# Patient Record
Sex: MALE | Race: OTHER | HISPANIC OR LATINO | Employment: UNEMPLOYED | URBAN - METROPOLITAN AREA
[De-identification: names, ages, dates, MRNs, and addresses within clinical notes are randomized per-mention and may not be internally consistent; named-entity substitution may affect disease eponyms.]

---

## 2017-01-30 ENCOUNTER — HOSPITAL ENCOUNTER (EMERGENCY)
Facility: HOSPITAL | Age: 47
Discharge: HOME | End: 2017-01-30
Attending: EMERGENCY MEDICINE

## 2017-01-30 VITALS
BODY MASS INDEX: 24.25 KG/M2 | TEMPERATURE: 98 F | DIASTOLIC BLOOD PRESSURE: 79 MMHG | HEART RATE: 55 BPM | OXYGEN SATURATION: 100 % | WEIGHT: 160 LBS | RESPIRATION RATE: 28 BRPM | HEIGHT: 68 IN | SYSTOLIC BLOOD PRESSURE: 152 MMHG

## 2017-01-30 DIAGNOSIS — M25.511 SHOULDER PAIN, RIGHT: ICD-10-CM

## 2017-01-30 DIAGNOSIS — S43.004A SHOULDER DISLOCATION, RIGHT, INITIAL ENCOUNTER: Primary | ICD-10-CM

## 2017-01-30 PROCEDURE — 23650 CLTX SHO DSLC W/MNPJ WO ANES: CPT

## 2017-01-30 PROCEDURE — 99284 EMERGENCY DEPT VISIT MOD MDM: CPT | Mod: 25

## 2017-01-30 PROCEDURE — 96375 TX/PRO/DX INJ NEW DRUG ADDON: CPT

## 2017-01-30 PROCEDURE — 25000003 PHARM REV CODE 250: Performed by: EMERGENCY MEDICINE

## 2017-01-30 PROCEDURE — 63600175 PHARM REV CODE 636 W HCPCS: Performed by: EMERGENCY MEDICINE

## 2017-01-30 PROCEDURE — 96361 HYDRATE IV INFUSION ADD-ON: CPT

## 2017-01-30 PROCEDURE — 96374 THER/PROPH/DIAG INJ IV PUSH: CPT

## 2017-01-30 RX ORDER — MORPHINE SULFATE 2 MG/ML
6 INJECTION, SOLUTION INTRAMUSCULAR; INTRAVENOUS
Status: COMPLETED | OUTPATIENT
Start: 2017-01-30 | End: 2017-01-30

## 2017-01-30 RX ORDER — IBUPROFEN 400 MG/1
800 TABLET ORAL
Status: DISCONTINUED | OUTPATIENT
Start: 2017-01-30 | End: 2017-01-30

## 2017-01-30 RX ORDER — HYDROCODONE BITARTRATE AND ACETAMINOPHEN 5; 325 MG/1; MG/1
1 TABLET ORAL EVERY 6 HOURS PRN
Qty: 15 TABLET | Refills: 0 | Status: SHIPPED | OUTPATIENT
Start: 2017-01-30 | End: 2020-02-05 | Stop reason: CLARIF

## 2017-01-30 RX ORDER — DIAZEPAM 10 MG/2ML
5 INJECTION INTRAMUSCULAR
Status: COMPLETED | OUTPATIENT
Start: 2017-01-30 | End: 2017-01-30

## 2017-01-30 RX ORDER — DIAZEPAM 5 MG/1
5 TABLET ORAL EVERY 8 HOURS PRN
Qty: 15 TABLET | Refills: 0 | Status: SHIPPED | OUTPATIENT
Start: 2017-01-30 | End: 2017-03-01

## 2017-01-30 RX ORDER — PROPOFOL 10 MG/ML
100 VIAL (ML) INTRAVENOUS
Status: COMPLETED | OUTPATIENT
Start: 2017-01-30 | End: 2017-01-30

## 2017-01-30 RX ADMIN — SODIUM CHLORIDE 1000 ML: 0.9 INJECTION, SOLUTION INTRAVENOUS at 09:01

## 2017-01-30 RX ADMIN — MORPHINE SULFATE 6 MG: 2 INJECTION, SOLUTION INTRAMUSCULAR; INTRAVENOUS at 08:01

## 2017-01-30 RX ADMIN — PROPOFOL 100 MG: 10 INJECTION, EMULSION INTRAVENOUS at 09:01

## 2017-01-30 RX ADMIN — DIAZEPAM 5 MG: 5 INJECTION, SOLUTION INTRAMUSCULAR; INTRAVENOUS at 08:01

## 2017-01-30 NOTE — ED AVS SNAPSHOT
[facility]  [address]  [place] [address]               Wes shefali Batista   2017  8:22 PM   ED    Descripción:  Male : 1970   Departamento:  [facility]           Rivera Cuidado fue coordinado por:     Provider Role From To    Gwen Garcia MD Attending Provider 17 --      Razón de la cydney     Shoulder Injury           Diagnósticos de Esta Visita        Comentarios    Shoulder dislocation, right, initial encounter    -  Primario     Shoulder pain, right           ED Disposition     Ninguna           Lista de tareas           Información de seguimiento     Realice un seguimiento con:  [facility]    Cómo:  Eyad    Cuándo:  2017    Especialidades:  Neurosurgery, Plastic Surgery, Podiatry, Surgical Oncology, Allergy, Cardiothoracic Surgery, Otolaryngology, Gastroenterology, Breast Surgery, Oral Surgery, Oral and Maxillofacial Surgery, Cardiology, Bariatrics, Internal Medicine, Family Medicine, Colon and Rectal Surgery    Por qué:  Orthopedics    Información de contacto:    [address] [facility] [place] [address]  [phone]          Realice un seguimiento con:  Jhonatan Shaefr MD    Cómo:  Eyad    Cuándo:  2017    Especialidad:  Orthopedic Surgery    Información de contacto:    [address]  [address]  [place] [address]  [phone]        Recetas para recoger        Disp Refills Start End    diazePAM (VALIUM) 5 MG tablet 15 tablet 0 2017 3/1/2017    Take 1 tablet (5 mg total) by mouth every 8 (eight) hours as needed (right shoulder spasms). - Oral    hydrocodone-acetaminophen 5-325mg (NORCO) 5-325 mg per tablet 15 tablet 0 2017     Take 1 tablet by mouth every 6 (six) hours as needed for Pain. - Oral      [facility]     [facility] -  Assistance  Registered nurses in the [facility] provide clinical advisement, health education, appointment booking, and  other advisory services.  Call for this free service at [phone].             Medicamentos           Mensaje sobre Medicamentos     Verify the changes and/or additions to your medication regime listed below are the same as discussed with your clinician today.  If any of these changes or additions are incorrect, please notify your healthcare provider.        EMPEZAR a chauncey estos medicamentos NUEVOS        Refills    diazePAM (VALIUM) 5 MG tablet 0    Sig: Take 1 tablet (5 mg total) by mouth every 8 (eight) hours as needed (right shoulder spasms).    Categoría: Print    Vía: Oral    hydrocodone-acetaminophen 5-325mg (NORCO) 5-325 mg per tablet 0    Sig: Take 1 tablet by mouth every 6 (six) hours as needed for Pain.    Categoría: Print    Vía: Oral      These medications were administered today        Dose Freq    sodium chloride 0.9% bolus 1,000 mL 1,000 mL ED 1 Time    Sig: Inject 1,000 mLs into the vein ED 1 Time.    Categoría: Normal    Vía: Intravenous    morphine injection 6 mg 6 mg ED 1 Time    Sig: Inject 3 mLs (6 mg total) into the vein ED 1 Time.    Categoría: Normal    Vía: Intravenous    diazePAM injection 5 mg 5 mg ED 1 Time    Sig: Inject 1 mL (5 mg total) into the vein ED 1 Time.    Categoría: Normal    Vía: Intravenous    propofol (DIPRIVAN) 10 mg/mL  mg ED 1 Time    Sig: Inject 10 mLs (100 mg total) into the vein ED 1 Time.    Categoría: Normal    Vía: Intravenous           Verifique que la siguiente lista de medicamentos es celia representación exacta de los medicamentos que está tomando actualmente. Si no hay ningunos reportados, la lista puede estar en reina. Si no es correcta, por favor póngase en contacto con young proveedor de atención médica. Lleve esta lista con usted en alex de emergencia.           Medicamentos Actuales     diazePAM (VALIUM) 5 MG tablet Take 1 tablet (5 mg total) by mouth every 8 (eight) hours as needed (right shoulder spasms).    diazePAM injection 5 mg Inject 1 mL  "(5 mg total) into the vein ED 1 Time.    hydrocodone-acetaminophen 5-325mg (NORCO) 5-325 mg per tablet Take 1 tablet by mouth every 6 (six) hours as needed for Pain.    propofol (DIPRIVAN) 10 mg/mL IVP Inject 10 mLs (100 mg total) into the vein ED 1 Time.           Información de referencia clínica           Sharonda signos vitales teresa     PS Pulso Temperatura Resp [place] Peso    126/75 72 97.6 °F (36.4 °C) (Oral) 16 5' 8" (1.727 m) 72.6 kg (160 lb)    SpO2 BMI (IMC)                98% 24.33 kg/m2          Alergias     A partir del:  1/30/2017        No Known Allergies      Vacunas     Administradas en la fecha de la visita:  1/30/2017        None      ED Micro, Lab, POCT     None      ED Imaging Orders     Start Ordered       Status Ordering Provider    01/30/17 2130 01/30/17 2130  X-ray Shoulder 2 or More Views Right  1 time imaging      In process     01/30/17 1942 01/30/17 1942  X-Ray Shoulder Trauma Right  1 time imaging      Final result         Instrucciones a roberto de talya       Apply ice to areas of pain.  Take medications as directed.  Follow up with Orthopedics next week.  Wear sling until it is removed by the orthopedic doctor.              Referencias/Adjuntos de talya     DISLOCATION: SHOULDER (REDUCED) ([place])      Registrarse para [facility]     Activating your [facility] account is as easy as 1-2-3!     1) Visit [email or web address], select Sign Up Now, enter this activation code and your date of birth, then select Next.  [email or web address]  Expires: 3/16/2017  9:37 PM      2) Create a username and password to use when you visit [facility] in the future and select a security question in case you lose your password and select Next.    3) Enter your e-mail address and click Sign Up!    Additional Information  If you have questions, please e-mail [email or web address] or call [phone] to talk to our [facility] staff. Remember, [facility] is NOT to be used for urgent needs. For medical emergencies, dial 911.       "   Smoking Cessation     If you would like to quit smoking:   You may be eligible for free services if you are a [place] resident and started smoking cigarettes before 1988.  Call the Smoking Cessation Trust (SCT) toll free at (728) 605-5313 or (394) 552-5129.   Call -QUIT-NOW if you do not meet the above criteria.             [facility] complies with applicable Federal civil rights laws and does not discriminate on the basis of race, color, national origin, age, disability, or sex.        Language Assistance Services     ATTENTION: Language assistance services are available, free of charge. Please call [phone].      ATENCIÓN: Si habla español, tiene a young disposición servicios gratuitos de asistencia lingüística. Llame al [phone].     CHÚ Ý: N?u b?n nói Ti?ng Vi?t, có các d?ch v? h? tr? ngôn ng? mi?n phí dành cho b?n. G?i s? [phone].                    [facility] [address]  [place] [address]               Wes Lopez   2017  8:22 PM   ED    Description:  Male : 1970   Department:  [facility]           Your Care was Coordinated By:     Provider Role From To    Gwen Garcia MD Attending Provider 17 --      Reason for Visit     Shoulder Injury           Diagnoses this Visit        Comments    Shoulder dislocation, right, initial encounter    -  Primary     Shoulder pain, right           ED Disposition     None           To Do List           Follow-up Information     Follow up with [facility]. Call in 1 day.    Specialties:  Neurosurgery, Plastic Surgery, Podiatry, Surgical Oncology, Allergy, Cardiothoracic Surgery, Otolaryngology, Gastroenterology, Breast Surgery, Oral Surgery, Oral and Maxillofacial Surgery, Cardiology, Bariatrics, Internal Medicine, Family Medicine, Colon and Rectal Surgery    Why:  Orthopedics    Contact information:     [place]  [facility] [address]  [phone]          Follow up with Jhonatan Shafer MD. Call in 1 day.    Specialty:  Orthopedic Surgery    Contact information:    [address]  [address]  [place] [address]  [phone]         These Medications        Disp Refills Start End    diazePAM (VALIUM) 5 MG tablet 15 tablet 0 1/30/2017 3/1/2017    Take 1 tablet (5 mg total) by mouth every 8 (eight) hours as needed (right shoulder spasms). - Oral    hydrocodone-acetaminophen 5-325mg (NORCO) 5-325 mg per tablet 15 tablet 0 1/30/2017     Take 1 tablet by mouth every 6 (six) hours as needed for Pain. - Oral      [facility]     [facility] - 24/7 Assistance  Registered nurses in the [facility] provide clinical advisement, health education, appointment booking, and other advisory services.  Call for this free service at [phone].             Medications           Message regarding Medications     Verify the changes and/or additions to your medication regime listed below are the same as discussed with your clinician today.  If any of these changes or additions are incorrect, please notify your healthcare provider.        START taking these NEW medications        Refills    diazePAM (VALIUM) 5 MG tablet 0    Sig: Take 1 tablet (5 mg total) by mouth every 8 (eight) hours as needed (right shoulder spasms).    Class: Print    Route: Oral    hydrocodone-acetaminophen 5-325mg (NORCO) 5-325 mg per tablet 0    Sig: Take 1 tablet by mouth every 6 (six) hours as needed for Pain.    Class: Print    Route: Oral      These medications were administered today        Dose Freq    sodium chloride 0.9% bolus 1,000 mL 1,000 mL ED 1 Time    Sig: Inject 1,000 mLs into the vein ED 1 Time.    Class: Normal    Route: Intravenous    morphine injection 6 mg 6 mg ED 1 Time    Sig: Inject 3 mLs (6 mg total) into the vein ED 1 Time.    Class: Normal    Route: Intravenous    diazePAM injection 5 mg 5 mg ED 1 Time     "Sig: Inject 1 mL (5 mg total) into the vein ED 1 Time.    Class: Normal    Route: Intravenous    propofol (DIPRIVAN) 10 mg/mL  mg ED 1 Time    Sig: Inject 10 mLs (100 mg total) into the vein ED 1 Time.    Class: Normal    Route: Intravenous           Verify that the below list of medications is an accurate representation of the medications you are currently taking.  If none reported, the list may be blank. If incorrect, please contact your healthcare provider. Carry this list with you in case of emergency.           Current Medications     diazePAM (VALIUM) 5 MG tablet Take 1 tablet (5 mg total) by mouth every 8 (eight) hours as needed (right shoulder spasms).    diazePAM injection 5 mg Inject 1 mL (5 mg total) into the vein ED 1 Time.    hydrocodone-acetaminophen 5-325mg (NORCO) 5-325 mg per tablet Take 1 tablet by mouth every 6 (six) hours as needed for Pain.    propofol (DIPRIVAN) 10 mg/mL IVP Inject 10 mLs (100 mg total) into the vein ED 1 Time.           Clinical Reference Information           Your Vitals Were     BP Pulse Temp Resp Height Weight    126/75 72 97.6 °F (36.4 °C) (Oral) 16 5' 8" (1.727 m) 72.6 kg (160 lb)    SpO2 BMI                98% 24.33 kg/m2          Allergies as of 1/30/2017     No Known Allergies      Immunizations Administered on Date of Encounter - 1/30/2017     None      ED Micro, Lab, POCT     None      ED Imaging Orders     Start Ordered       Status Ordering Provider    01/30/17 2130 01/30/17 2130  X-ray Shoulder 2 or More Views Right  1 time imaging      In process     01/30/17 1942 01/30/17 1942  X-Ray Shoulder Trauma Right  1 time imaging      Final result         Discharge Instructions       Apply ice to areas of pain.  Take medications as directed.  Follow up with Orthopedics next week.  Wear sling until it is removed by the orthopedic doctor.              Discharge References/Attachments     DISLOCATION: SHOULDER (REDUCED) ([place])      [facility] Sign-Up     Activating " your [facility] account is as easy as 1-2-3!     1) Visit [email or web address], select Sign Up Now, enter this activation code and your date of birth, then select Next.  [email or web address]  Expires: 3/16/2017  9:37 PM      2) Create a username and password to use when you visit [facility] in the future and select a security question in case you lose your password and select Next.    3) Enter your e-mail address and click Sign Up!    Additional Information  If you have questions, please e-mail [email or web address] or call [phone] to talk to our [facility] staff. Remember, [facility] is NOT to be used for urgent needs. For medical emergencies, dial 911.         Smoking Cessation     If you would like to quit smoking:   You may be eligible for free services if you are a [place] resident and started smoking cigarettes before September 1, 1988.  Call the Smoking Cessation Trust ([facility]) toll free at (763) 519-4419 or (531) 843-1328.   Call 5-930-QUIT-NOW if you do not meet the above criteria.             [facility] complies with applicable Federal civil rights laws and does not discriminate on the basis of race, color, national origin, age, disability, or sex.        Language Assistance Services     ATTENTION: Language assistance services are available, free of charge. Please call [phone].      ATENCIÓN: Si habla español, tiene a young disposición servicios gratuitos de asistencia lingüística. Llame al [phone].     CHÚ Ý: N?u b?n nói Ti?ng Vi?t, có các d?ch v? h? tr? ngôn ng? mi?n phí dành cho b?n. G?i s? [phone].

## 2017-01-31 NOTE — DISCHARGE INSTRUCTIONS
Apply ice to areas of pain.  Take medications as directed.  Follow up with Orthopedics next week.  Wear sling until it is removed by the orthopedic doctor.

## 2017-01-31 NOTE — ED PROVIDER NOTES
Encounter Date: 1/30/2017       History     Chief Complaint   Patient presents with    Shoulder Injury     pt states he fell X 1 hour ago and shoulder became dislocated. deformity noted to right shoulder      Review of patient's allergies indicates:  No Known Allergies  HPI Comments: 46M presents with acute traumatic right shoulder pain.  He was coming down a ladder when he fell and landed with an outstretched right arm.  He had immediate onset of pain and felt it pop out of place.  Pain is constant and severe since onset, rated 10/10.  Worse with attempted ROM.  Associated numbness/tingling.  No other injuries or complaints.  One previous R shoulder dislocation 1 year ago; never saw ortho for follow up.    The history is provided by the patient. A  was used (Brennon Ladd).     History reviewed. No pertinent past medical history.  No past medical history pertinent negatives.  No past surgical history on file.  History reviewed. No pertinent family history.  Social History   Substance Use Topics    Smoking status: None    Smokeless tobacco: None    Alcohol use None     Review of Systems   Respiratory: Negative for shortness of breath.    Cardiovascular: Negative for chest pain.   Musculoskeletal: Positive for arthralgias.   Neurological: Positive for numbness.   All other systems reviewed and are negative.      Physical Exam   Initial Vitals   BP Pulse Resp Temp SpO2   01/30/17 1937 01/30/17 1937 01/30/17 1937 01/30/17 1937 --   153/90 63 16 97.6 °F (36.4 °C)      Physical Exam    Nursing note and vitals reviewed.  Constitutional: He appears well-developed and well-nourished. He appears distressed.   HENT:   Head: Normocephalic and atraumatic.   Eyes: Conjunctivae are normal.   Neck: Normal range of motion.   Cardiovascular: Normal rate, regular rhythm, normal heart sounds and intact distal pulses.   Pulmonary/Chest: Breath sounds normal. No respiratory distress.   Abdominal: Bowel sounds  "are normal. He exhibits no distension.   Musculoskeletal:   R shoulder deformity - appears dislocated   Neurological: He is alert and oriented to person, place, and time. He has normal strength. No sensory deficit.   Skin: Skin is warm and dry.   Psychiatric: He has a normal mood and affect. His behavior is normal.         ED Course   Procedural Sedation  Date/Time: 1/30/2017 9:20 PM  Performed by: VIJAYA JIN  Authorized by: VIJAYA JIN   Consent Done: Yes  Consent: Verbal consent obtained. Written consent obtained.  Risks and benefits: risks, benefits and alternatives were discussed  Consent given by: patient  Patient understanding: patient states understanding of the procedure being performed  Patient consent: the patient's understanding of the procedure matches consent given  Procedure consent: procedure consent matches procedure scheduled  Relevant documents: relevant documents present and verified  Test results: test results available and properly labeled  Site marked: the operative site was marked  Imaging studies: imaging studies available  Required items: required blood products, implants, devices, and special equipment available  Patient identity confirmed: name and verbally with patient  Time out: Immediately prior to procedure a "time out" was called to verify the correct patient, procedure, equipment, support staff and site/side marked as required.  ASA Class: Class 1 - Heathy patient. No medical history.   Mallampati Score: Class 2 - Visualization of the soft palate, fauces, and uvula.   Equipment: on cardiac monitor., on BP monitor., on CO2 monitor., on supplemental oxygen., suction available. and airway equipment available.   Sedation type: moderate (conscious) sedation  (See MAR for exact dosages of medications).  Sedatives: propofol  Analgesia: morphine  Vitals: Vital signs were monitored during sedation.  Complications: No complications.   Comments: See nurses notes for procedure " "times.    Orthopedic Injury  Date/Time: 1/30/2017 9:41 PM  Authorized by: VIJAYA JIN   Performed by: VIJAYA JIN  Consent Done: Yes  Consent: Written consent obtained.  Risks and benefits: risks, benefits and alternatives were discussed  Consent given by: patient  Patient understanding: patient states understanding of the procedure being performed  Patient consent: the patient's understanding of the procedure matches consent given  Procedure consent: procedure consent matches procedure scheduled  Relevant documents: relevant documents present and verified  Test results: test results available and properly labeled  Site marked: the operative site was marked  Imaging studies: imaging studies available  Required items: required blood products, implants, devices, and special equipment available  Patient identity confirmed: name  Time out: Immediately prior to procedure a "time out" was called to verify the correct patient, procedure, equipment, support staff and site/side marked as required.  Injury location: shoulder  Location details: right shoulder  Injury type: dislocation  Dislocation type: anterior  Hill-Sachs deformity: no  Chronicity: new  Pre-procedure distal perfusion: normal  Pre-procedure neurological function: normal  Pre-procedure neurovascular assessment: neurovascularly intact  Pre-procedure range of motion: reduced  Local anesthesia used: no    Anesthesia:  Local anesthesia used: no  Sedation:  Patient sedated: yes (see sedation note)    Manipulation performed: yes  Reduction method: traction and counter traction  Reduction successful: yes  X-ray confirmed reduction: yes  Immobilization: sling  Complications: No  Post-procedure neurovascular assessment: post-procedure neurovascularly intact  Post-procedure distal perfusion: normal  Post-procedure neurological function: normal  Post-procedure range of motion: improved  Patient tolerance: Patient tolerated the procedure well with no immediate " complications        Labs Reviewed - No data to display       X-Rays:   Independently Interpreted Readings:   Other Readings:  R shoulder - anterior dislocation    R shoulder - successful reduction    Medical Decision Making:   Independently Interpreted Test(s):   I have ordered and independently interpreted X-rays - see prior notes.  Clinical Tests:   Radiological Study: Ordered and Reviewed  ED Management:  Traumatic R shoulder dislocation clinically and on xray.  Shoulder reduced by me per procedure notes.  Sucessful reduction without complication.    Pt discharged in stable condition with orthopedic follow up.                   ED Course     Clinical Impression:   The primary encounter diagnosis was Shoulder dislocation, right, initial encounter. A diagnosis of Shoulder pain, right was also pertinent to this visit.          Gwen Garcia MD  01/30/17 9809

## 2018-12-07 ENCOUNTER — HOSPITAL ENCOUNTER (EMERGENCY)
Facility: HOSPITAL | Age: 48
Discharge: HOME | End: 2018-12-07
Attending: INTERNAL MEDICINE

## 2018-12-07 VITALS
HEIGHT: 67 IN | DIASTOLIC BLOOD PRESSURE: 82 MMHG | OXYGEN SATURATION: 99 % | TEMPERATURE: 98 F | RESPIRATION RATE: 18 BRPM | HEART RATE: 58 BPM | SYSTOLIC BLOOD PRESSURE: 139 MMHG | BODY MASS INDEX: 26.68 KG/M2 | WEIGHT: 170 LBS

## 2018-12-07 DIAGNOSIS — S43.004A DISLOCATION OF RIGHT SHOULDER JOINT, INITIAL ENCOUNTER: Primary | ICD-10-CM

## 2018-12-07 PROCEDURE — 25000003 PHARM REV CODE 250: Performed by: PHYSICIAN ASSISTANT

## 2018-12-07 PROCEDURE — 99284 EMERGENCY DEPT VISIT MOD MDM: CPT | Mod: 57,,, | Performed by: EMERGENCY MEDICINE

## 2018-12-07 PROCEDURE — 23650 CLTX SHO DSLC W/MNPJ WO ANES: CPT | Mod: 54,RT,, | Performed by: EMERGENCY MEDICINE

## 2018-12-07 PROCEDURE — 99285 EMERGENCY DEPT VISIT HI MDM: CPT | Mod: 25

## 2018-12-07 PROCEDURE — 23650 CLTX SHO DSLC W/MNPJ WO ANES: CPT | Mod: RT

## 2018-12-07 RX ORDER — LIDOCAINE HYDROCHLORIDE 10 MG/ML
5 INJECTION, SOLUTION EPIDURAL; INFILTRATION; INTRACAUDAL; PERINEURAL
Status: COMPLETED | OUTPATIENT
Start: 2018-12-07 | End: 2018-12-07

## 2018-12-07 RX ADMIN — LIDOCAINE HYDROCHLORIDE 50 MG: 10 INJECTION, SOLUTION EPIDURAL; INFILTRATION; INTRACAUDAL; PERINEURAL at 04:12

## 2018-12-07 NOTE — DISCHARGE INSTRUCTIONS
Motrin Tylenol as needed for pain.  Please return for any worsening or concerns.  If you would like to follow up with the [facility] for further care of your fracture, please call the [facility] Scheduling Department at [phone] during business hours.  Please let the  know you need a dislocation follow-up appointment with Orthopedics, and you will be scheduled in the Orthopedic Clinic.  Please bring your original Emergency Department discharge papers with  you to the clinic appointment.

## 2018-12-07 NOTE — ED PROVIDER NOTES
Encounter Date: 12/7/2018       History     Chief Complaint   Patient presents with    Shoulder Injury     Patient is a 48 year old Persian-speaking male presenting to the ED for evaluation of right shoulder injury. Patient was swatting an insect and felt like he dislocated his shoulder. He has had a history of previous shoulder dislocations in the past. No direct trauma or fall. He denies numbness or tingling.     Translated by Dr. Barrow.       The history is provided by the patient.     Review of patient's allergies indicates:  No Known Allergies  History reviewed. No pertinent past medical history.  History reviewed. No pertinent surgical history.  No family history on file.  Social History     Tobacco Use    Smoking status: Current Every Day Smoker    Smokeless tobacco: Never Used   Substance Use Topics    Alcohol use: No     Frequency: Never    Drug use: No     Review of Systems   Constitutional: Negative for chills and fever.   Musculoskeletal: Positive for arthralgias. Negative for joint swelling.   Skin: Negative for rash and wound.   Allergic/Immunologic: Negative for immunocompromised state.   Neurological: Negative for weakness and numbness.   Hematological: Does not bruise/bleed easily.   Psychiatric/Behavioral: Negative for confusion.       Physical Exam     Initial Vitals [12/07/18 1606]   BP Pulse Resp Temp SpO2   139/82 (!) 58 18 98.4 °F (36.9 °C) 99 %      MAP       --         Physical Exam    Vitals reviewed.  Constitutional: He appears well-developed and well-nourished. He is not diaphoretic. No distress.   HENT:   Head: Normocephalic and atraumatic.   Eyes: EOM are normal.   Cardiovascular: Intact distal pulses and normal pulses.   Pulses:       Radial pulses are 2+ on the right side, and 2+ on the left side.   Musculoskeletal:        Right shoulder: He exhibits decreased range of motion, tenderness and deformity. He exhibits no bony tenderness, no swelling and normal pulse.    Neurological: He is alert and oriented to person, place, and time.   Skin: Skin is warm and dry.         ED Course   Procedures  Labs Reviewed - No data to display       Imaging Results          X-ray Shoulder 2 or More Views Right (In process)    Procedure changed from X-Ray Shoulder Trauma Right                        APC / Resident Notes:   Patient was seen in the ER promptly upon arrival.  He is afebrile, no acute distress. Physical examination reveals deformity to the right shoulder.  Range of motion to the right shoulder limited.  Distal pulses intact.  1% lidocaine used for intracuticular block performed by Dr. Barrow. Shoulder was reduced, patient tolerated the procedure well.       Post reduction x-ray obtained. Shoulder in place.  Patient was given information for follow-up with [facility].  He was also given a sling for support.  Patient is stable for discharge at this time. The care of this patient was overseen by attending physician who agrees with treatment, plan, and disposition.                   Clinical Impression:   The primary encounter diagnosis was Dislocation of right shoulder joint, initial encounter. A diagnosis of Dislocation was also pertinent to this visit.      Disposition:   Disposition: Discharged  Condition: Stable                        Marian Breaux PA-C  12/07/18 9187

## 2018-12-07 NOTE — ED PROVIDER NOTES
Encounter Date: 12/7/2018       History     Chief Complaint   Patient presents with    Shoulder Injury     HPI  Review of patient's allergies indicates:  No Known Allergies  History reviewed. No pertinent past medical history.  History reviewed. No pertinent surgical history.  No family history on file.  Social History     Tobacco Use    Smoking status: Current Every Day Smoker    Smokeless tobacco: Never Used   Substance Use Topics    Alcohol use: No     Frequency: Never    Drug use: No     Review of Systems    Physical Exam     Initial Vitals [12/07/18 1606]   BP Pulse Resp Temp SpO2   139/82 (!) 58 18 98.4 °F (36.9 °C) 99 %      MAP       --         Physical Exam    ED Course   Orthopedic Injury  Date/Time: 12/7/2018 5:16 PM  Performed by: Anette Barrow MD  Authorized by: Anette Barrow MD     Location procedure was performed:  [facility] EMERGENCY DEPARTMENT  Assisting Provider:  Marian Breaux PA-C  Pre-operative diagnosis:  Shoulder dislocation  Post-operative diagnosis:  Relocated shoulder  Injury:     Injury location:  Shoulder      Pre-procedure assessment:     Neurovascular status: Neurovascularly intact      Distal perfusion: normal      Neurological function: normal      Range of motion: normal      Local anesthesia used?: Yes      Local anesthetic:  Lidocaine 1% without epinephrine (intraarticular block)    Anesthetic total (ml):  10    Patient sedated?: No        Procedure details:     Description of findings:  Dislocation right shoulder  Selections made in this section will also lock the Injury type section above.:     Immobilization:  Sling    Technical Procedures Used:  Massage trap, biceps, external rotation flexion at shoulder    Complications: No      Estimated blood loss (mL):  0      Labs Reviewed - No data to display       Imaging Results          X-ray Shoulder 2 or More Views Right (In process)    Procedure changed from X-Ray Shoulder Trauma Right                                        Clinical Impression:  Shoulder dislocation   The primary encounter diagnosis was Dislocation of right shoulder joint, initial encounter. A diagnosis of Dislocation was also pertinent to this visit.                             Anette Barrow MD  12/07/18 8141

## 2020-02-05 ENCOUNTER — HOSPITAL ENCOUNTER (EMERGENCY)
Facility: HOSPITAL | Age: 50
Discharge: HOME | End: 2020-02-05
Attending: EMERGENCY MEDICINE

## 2020-02-05 ENCOUNTER — ANESTHESIA (OUTPATIENT)
Dept: ANESTHESIOLOGY | Facility: HOSPITAL | Age: 50
End: 2020-02-05

## 2020-02-05 ENCOUNTER — ANESTHESIA EVENT (OUTPATIENT)
Dept: ANESTHESIOLOGY | Facility: HOSPITAL | Age: 50
End: 2020-02-05

## 2020-02-05 VITALS
DIASTOLIC BLOOD PRESSURE: 87 MMHG | WEIGHT: 170 LBS | HEART RATE: 56 BPM | BODY MASS INDEX: 26.63 KG/M2 | RESPIRATION RATE: 15 BRPM | TEMPERATURE: 97 F | OXYGEN SATURATION: 98 % | SYSTOLIC BLOOD PRESSURE: 162 MMHG

## 2020-02-05 DIAGNOSIS — S43.016A ANTERIOR SHOULDER DISLOCATION: ICD-10-CM

## 2020-02-05 DIAGNOSIS — S43.014A ANTERIOR DISLOCATION OF RIGHT SHOULDER, INITIAL ENCOUNTER: ICD-10-CM

## 2020-02-05 PROCEDURE — 63600175 PHARM REV CODE 636 W HCPCS: Performed by: NURSE ANESTHETIST, CERTIFIED REGISTERED

## 2020-02-05 PROCEDURE — D9220A PRA ANESTHESIA: Mod: ,,, | Performed by: ANESTHESIOLOGY

## 2020-02-05 PROCEDURE — 63600175 PHARM REV CODE 636 W HCPCS: Performed by: EMERGENCY MEDICINE

## 2020-02-05 PROCEDURE — 23650 CLTX SHO DSLC W/MNPJ WO ANES: CPT | Mod: RT

## 2020-02-05 PROCEDURE — 99284 EMERGENCY DEPT VISIT MOD MDM: CPT | Mod: 25

## 2020-02-05 PROCEDURE — D9220A PRA ANESTHESIA: ICD-10-PCS | Mod: ,,, | Performed by: ANESTHESIOLOGY

## 2020-02-05 PROCEDURE — 96374 THER/PROPH/DIAG INJ IV PUSH: CPT

## 2020-02-05 PROCEDURE — 37000008 HC ANESTHESIA 1ST 15 MINUTES

## 2020-02-05 RX ORDER — IBUPROFEN 600 MG/1
600 TABLET ORAL EVERY 6 HOURS PRN
Qty: 20 TABLET | Refills: 0 | Status: SHIPPED | OUTPATIENT
Start: 2020-02-05

## 2020-02-05 RX ORDER — HYDROMORPHONE HYDROCHLORIDE 2 MG/ML
1 INJECTION, SOLUTION INTRAMUSCULAR; INTRAVENOUS; SUBCUTANEOUS
Status: COMPLETED | OUTPATIENT
Start: 2020-02-05 | End: 2020-02-05

## 2020-02-05 RX ORDER — PROPOFOL 10 MG/ML
VIAL (ML) INTRAVENOUS
Status: DISCONTINUED | OUTPATIENT
Start: 2020-02-05 | End: 2020-02-05

## 2020-02-05 RX ORDER — SODIUM CHLORIDE 9 MG/ML
1000 INJECTION, SOLUTION INTRAVENOUS
Status: COMPLETED | OUTPATIENT
Start: 2020-02-05 | End: 2020-02-05

## 2020-02-05 RX ORDER — HYDROMORPHONE HYDROCHLORIDE 2 MG/ML
1 INJECTION, SOLUTION INTRAMUSCULAR; INTRAVENOUS; SUBCUTANEOUS
Status: DISCONTINUED | OUTPATIENT
Start: 2020-02-05 | End: 2020-02-05

## 2020-02-05 RX ORDER — LIDOCAINE HYDROCHLORIDE 20 MG/ML
INJECTION INTRAVENOUS
Status: DISCONTINUED | OUTPATIENT
Start: 2020-02-05 | End: 2020-02-05

## 2020-02-05 RX ADMIN — HYDROMORPHONE HYDROCHLORIDE 1 MG: 2 INJECTION, SOLUTION INTRAMUSCULAR; INTRAVENOUS; SUBCUTANEOUS at 12:02

## 2020-02-05 RX ADMIN — Medication 60 MG: at 02:02

## 2020-02-05 RX ADMIN — SODIUM CHLORIDE: 0.9 INJECTION, SOLUTION INTRAVENOUS at 02:02

## 2020-02-05 RX ADMIN — PROPOFOL 150 MG: 10 INJECTION, EMULSION INTRAVENOUS at 02:02

## 2020-02-05 NOTE — ED NOTES
"Pt called this RN to room as passing by. Pt states "get this fucking thing out of my arm". I advised pt that he needed to wait until I could speak to his nurse, pt states "what the fuck are you waiting for, I want it out now". This RN called nurse to room to speak with patient, pt continued to pressure nurse to remove IV and now speaking Albanian that he wants to leave. Security called for assistance.   "

## 2020-02-05 NOTE — ED NOTES
"Pt is obviously angry with staff and complains as follows,"the process here is very slow. At the hospital in [place] they gave me a shot and put my shoulder back in and I got to leave right away. Here they are making me wait for so long. I'm hungry, I'm upset. My friend is here because you told me I could leave but now I cannot leave. Why is this taking so long I need to go. Tell the doctor."      Pt reported this to me in Estonian, translated into English by myself. In my communication with patient, he agreed to speak with me in Estonian and denied use of Machelle when I offered. Machelle utilized with other staff.   "

## 2020-02-05 NOTE — ED NOTES
Pt upset and wanting to leave. Larissa at bedside explaining to pt why he has to wait. Security to bedside

## 2020-02-05 NOTE — ED NOTES
Anesthesia at bedside to sedate pt for procedure. Dr Elaine at bedside. Sedation performed without ED RN at bedside

## 2020-02-05 NOTE — TRANSFER OF CARE
Anesthesia Transfer of Care Note    Patient: Wes Lopez    Procedure(s) Performed: Procedure(s) (LRB):  CLOSED REDUCTION RIGHT SHOULDER (Right)    Patient location: Emergency Department    Anesthesia Type: general    Transport from OR: Transported from OR on 2-3 L/min O2 by NC with adequate spontaneous ventilation    Post pain: adequate analgesia    Post assessment: no apparent anesthetic complications and tolerated procedure well    Post vital signs: stable    Level of consciousness: awake, alert and oriented    Nausea/Vomiting: no nausea/vomiting    Complications: none    Transfer of care protocol was followed      Last vitals:   Visit Vitals  BP (!) 115/59   Pulse 74   Temp 36.3 °C (97.4 °F) (Oral)   Resp 15   Wt 77.1 kg (170 lb)   SpO2 98%   BMI 26.63 kg/m²

## 2020-02-05 NOTE — ED TRIAGE NOTES
"Reports pain to right shoulder after lifting above his head and feeling "like my shoulder dislocated". Pt has hx of same about a year ago  "

## 2020-02-05 NOTE — ED PROVIDER NOTES
Encounter Date: 2/5/2020    SCRIBE #1 NOTE: I, Reji Zapata, am scribing for, and in the presence of,  Prabhjot Elaine MD. I have scribed the following portions of the note - Other sections scribed: HPI, GERONIMO.       History     Chief Complaint   Patient presents with    Shoulder Injury     pt Malay speaking but able to state that four things fell on his right shoulder. pt unable to move right shoulder     49 y.o M with no pertinent PMHx presents to the ED c/o acute onset of constant and severe (10/10) R shoulder pain which occurred PTA. The pt states he was working and quickly lifted his arm, causing his shoulder to dislocate. He reports a previous episode of right shoulder dislocation x1 year ago. He denies fever, chills, headache, otalgia, sore throat, chest pain, cough, abdominal pain, emesis, diarrhea, dysuria, lower extremity pain, hand pain and numbness. He does not smoke cigarettes, consume EtOH or use illicit drugs. No prior tx.       The history is provided by the patient. A  was used (Princess).     Review of patient's allergies indicates:  No Known Allergies  History reviewed. No pertinent past medical history.  History reviewed. No pertinent surgical history.  History reviewed. No pertinent family history.  Social History     Tobacco Use    Smoking status: Current Every Day Smoker    Smokeless tobacco: Never Used   Substance Use Topics    Alcohol use: No     Frequency: Never    Drug use: No     Review of Systems   Constitutional: Negative for chills and fever.   HENT: Negative for congestion, ear pain, rhinorrhea and sore throat.    Eyes: Negative for pain and visual disturbance.   Respiratory: Negative for cough and shortness of breath.    Cardiovascular: Negative for chest pain.   Gastrointestinal: Negative for abdominal pain, diarrhea, nausea and vomiting.   Genitourinary: Negative for dysuria.   Musculoskeletal: Negative for back pain and neck pain.        (+) R shoulder  pain  (-) hand pain   Skin: Negative for rash.   Neurological: Negative for numbness and headaches.       Physical Exam     Initial Vitals   BP Pulse Resp Temp SpO2   02/05/20 1430 02/05/20 1155 02/05/20 1155 02/05/20 1155 02/05/20 1155   (!) 115/59 (!) 58 16 97.4 °F (36.3 °C) 100 %      MAP       --                Physical Exam  The patient was examined specifically for the following:   General:No significant distress, Good color, Warm and dry. Head and neck:Scalp atraumatic, Neck supple. Neurological:Appropriate conversation, Gross motor deficits. Eyes:Conjugate gaze, Clear corneas. ENT: No epistaxis. Cardiac: Regular rate and rhythm, Grossly normal heart tones. Pulmonary: Wheezing, Rales. Gastrointestinal: Abdominal tenderness, Abdominal distention. Musculoskeletal: Extremity deformity, Apparent pain with range of motion of the joints. Skin: Rash.   The findings on examination were normal except for the following:  Patient has an obvious anterior right shoulder dislocation.  Right upper extremity neurologic examination is normal.  Lungs are clear.  The heart tones are normal.  The abdomen is soft.  ED Course   Orthopedic Injury  Date/Time: 2/5/2020 4:14 PM  Performed by: Prabhjot Elaine MD  Authorized by: Prabhjot Elaine MD     Injury:     Injury location:  Shoulder    Injury type:  Dislocation    Dislocation type: anterior      Chronicity:  Recurrent    Hill-Sachs deformity?: No        Pre-procedure assessment:     Distal perfusion: normal      Neurological function: normal      Range of motion: reduced      Local anesthesia used?: No        Selections made in this section will also lock the Injury type section above.:     Technical Procedures Used:  Shoulder reduction Spaso technique    Complications: No      Specimens: No      Implants: No    Post-procedure assessment:     Distal perfusion: normal      Neurological function: normal      Range of motion: improved       A shoulder immobilizer was used.  Post  reduction neurologic examination was unremarkable.      Labs Reviewed - No data to display       Imaging Results          X-Ray Shoulder Complete 2 View Right (Final result)  Result time 02/05/20 15:59:32    Final result by Laith Tsai DO (02/05/20 15:59:32)                 Impression:      Please see above      Electronically signed by: Laith Tsai DO  Date:    02/05/2020  Time:    15:59             Narrative:    EXAMINATION:  XR SHOULDER COMPLETE 2 OR MORE VIEWS RIGHT    CLINICAL HISTORY:  Anterior dislocation of unspecified humerus, initial encounter    TECHNIQUE:  AP internal/external rotation and scapular Y-views of the right shoulder.    COMPARISON:  02/05/2020    FINDINGS:  Interval reduction of previous anterior right shoulder dislocation.  There is no definite acute fracture line although limited by two view only technique.  No focal bony erosion.  No consolidation visualized right lung.  Clinical correlation and further evaluation as warranted.                               X-Ray Shoulder Trauma Right (Final result)  Result time 02/05/20 13:12:32    Final result by Carlos Chi MD (02/05/20 13:12:32)                 Impression:      1. Anterior inferior right glenohumeral dislocation as above.  Follow-up radiography recommended to exclude humeral impaction injury.      Electronically signed by: Carlos Chi MD  Date:    02/05/2020  Time:    13:12             Narrative:    EXAMINATION:  XR SHOULDER TRAUMA 3 VIEW RIGHT    CLINICAL HISTORY:  Anterior dislocation of right humerus, initial encounter    TECHNIQUE:  Three or four views of the right shoulder were performed.    COMPARISON:  12/07/2018    FINDINGS:  Three views right shoulder.    There is abnormal inferior anterior placement of the right humeral head in relation to the glenoid.  The acromioclavicular joint is intact.  No acute displaced right rib fracture.  The right lung zones are grossly clear.  No pneumothorax.                               Medical decision making:  This patient presents to the emergency room with a right anterior shoulder dislocation.  The dislocation was reduced.  Patient was neurologically intact post reduction.  X-rays postreduction failed to reveal fracture.  I will discharge this patient to outpatient evaluation and treatment.                  Scribe Attestation:   Scribe #1: I performed the above scribed service and the documentation accurately describes the services I performed. I attest to the accuracy of the note.                          Clinical Impression:       ICD-10-CM ICD-9-CM   1. Anterior dislocation of right shoulder, initial encounter S43.014A 831.01   2. Anterior shoulder dislocation S43.016A 831.01            I personally performed the services described in this documentation.  All medical record  entries made by the scribe are at my direction and in my presence.  Signed, Dr. Chele Elaine MD  02/05/20 5447       Prabhjot Elaine MD  02/05/20 6208

## 2020-02-05 NOTE — ED NOTES
Awaiting repeat xray prior to leaving post reduction.  Informed patient of this.  Xray in progress now.

## 2020-02-05 NOTE — DISCHARGE INSTRUCTIONS
Please apply iceHer shoulder for 24 hr, then you may use heat.  Ibuprofen for pain. Please return immediately if you get worse or if new problems develop.  Please follow-up with the orthopedist above this week.  Rest.  Wear your shoulder immobilizer.

## 2020-02-10 NOTE — ANESTHESIA POSTPROCEDURE EVALUATION
Anesthesia Post Evaluation    Patient: Wes Lopez    Procedure(s) Performed: Procedure(s) (LRB):  CLOSED REDUCTION RIGHT SHOULDER (Right)    Final Anesthesia Type: general    Patient location during evaluation: ED  Patient participation: Yes- Able to Participate  Level of consciousness: awake and alert  Post-procedure vital signs: reviewed and stable  Pain management: adequate  Airway patency: patent    PONV status at discharge: No PONV  Anesthetic complications: no      Cardiovascular status: blood pressure returned to baseline and hemodynamically stable  Respiratory status: unassisted and spontaneous ventilation  Hydration status: euvolemic  Follow-up not needed.            No case tracking events are documented in the log.      Pain/Fabián Score: No data recorded

## 2020-02-10 NOTE — ANESTHESIA PREPROCEDURE EVALUATION
02/10/2020  Wes Lopez is a 49 y.o., male.    Anesthesia Evaluation     I have reviewed the Nursing Notes.      Review of Systems  Anesthesia Hx:  No problems with previous Anesthesia   Social:  Smoker    Cardiovascular:  Cardiovascular Normal     Pulmonary:  Pulmonary Normal    Renal/:  Renal/ Normal     Hepatic/GI:  Hepatic/GI Normal    Neurological:  Neurology Normal    Endocrine:  Endocrine Normal        Physical Exam  General:  Well nourished    Airway/Jaw/Neck:  AIRWAY FINDINGS: Normal      Chest/Lungs:  Chest/Lungs Clear    Heart/Vascular:  Heart Findings: Normal       Mental Status:  Mental Status Findings: Normal        Anesthesia Plan  Type of Anesthesia, risks & benefits discussed:  Anesthesia Type:  general  Patient's Preference:   Intra-op Monitoring Plan: standard ASA monitors  Intra-op Monitoring Plan Comments:   Post Op Pain Control Plan:   Post Op Pain Control Plan Comments:   Induction:   IV  Beta Blocker:  Patient is not currently on a Beta-Blocker (No further documentation required).       Informed Consent: Patient understands risks and agrees with Anesthesia plan.  Questions answered. Anesthesia consent signed with patient.  ASA Score: 2  emergent   Day of Surgery Review of History & Physical:  There are no significant changes.  H&P update referred to the provider.         Ready For Surgery From Anesthesia Perspective.

## 2021-12-10 ENCOUNTER — HOSPITAL ENCOUNTER (EMERGENCY)
Facility: HOSPITAL | Age: 51
Discharge: HOME | End: 2021-12-10
Attending: EMERGENCY MEDICINE
Payer: OTHER GOVERNMENT

## 2021-12-10 VITALS
HEIGHT: 67 IN | TEMPERATURE: 99 F | SYSTOLIC BLOOD PRESSURE: 119 MMHG | DIASTOLIC BLOOD PRESSURE: 62 MMHG | RESPIRATION RATE: 16 BRPM | OXYGEN SATURATION: 96 % | WEIGHT: 155 LBS | BODY MASS INDEX: 24.33 KG/M2 | HEART RATE: 82 BPM

## 2021-12-10 DIAGNOSIS — B34.9 VIRAL SYNDROME: Primary | ICD-10-CM

## 2021-12-10 LAB
CTP QC/QA: YES
SARS-COV-2 RDRP RESP QL NAA+PROBE: NEGATIVE

## 2021-12-10 PROCEDURE — 99282 EMERGENCY DEPT VISIT SF MDM: CPT | Mod: 25

## 2021-12-10 PROCEDURE — 99282 PR EMERGENCY DEPT VISIT,LEVEL II: ICD-10-PCS | Mod: CR,CS,, | Performed by: PHYSICIAN ASSISTANT

## 2021-12-10 PROCEDURE — 99282 EMERGENCY DEPT VISIT SF MDM: CPT | Mod: CR,CS,, | Performed by: PHYSICIAN ASSISTANT

## 2021-12-10 PROCEDURE — U0002 COVID-19 LAB TEST NON-CDC: HCPCS | Performed by: EMERGENCY MEDICINE

## 2022-02-16 ENCOUNTER — PATIENT OUTREACH (OUTPATIENT)
Dept: EMERGENCY MEDICINE | Facility: HOSPITAL | Age: 52
End: 2022-02-16

## 2022-02-16 ENCOUNTER — HOSPITAL ENCOUNTER (EMERGENCY)
Facility: HOSPITAL | Age: 52
Discharge: HOME | End: 2022-02-16
Attending: EMERGENCY MEDICINE

## 2022-02-16 VITALS
WEIGHT: 160 LBS | BODY MASS INDEX: 25.11 KG/M2 | RESPIRATION RATE: 16 BRPM | TEMPERATURE: 99 F | HEIGHT: 67 IN | SYSTOLIC BLOOD PRESSURE: 135 MMHG | HEART RATE: 62 BPM | OXYGEN SATURATION: 98 % | DIASTOLIC BLOOD PRESSURE: 66 MMHG

## 2022-02-16 DIAGNOSIS — R10.13 EPIGASTRIC ABDOMINAL PAIN: Primary | ICD-10-CM

## 2022-02-16 LAB
ALBUMIN SERPL BCP-MCNC: 4.2 G/DL (ref 3.5–5.2)
ALP SERPL-CCNC: 84 U/L (ref 55–135)
ALT SERPL W/O P-5'-P-CCNC: 23 U/L (ref 10–44)
ANION GAP SERPL CALC-SCNC: 9 MMOL/L (ref 8–16)
AST SERPL-CCNC: 22 U/L (ref 10–40)
BASOPHILS # BLD AUTO: 0.01 K/UL (ref 0–0.2)
BASOPHILS NFR BLD: 0.1 % (ref 0–1.9)
BILIRUB SERPL-MCNC: 0.9 MG/DL (ref 0.1–1)
BILIRUB UR QL STRIP: NEGATIVE
BUN SERPL-MCNC: 13 MG/DL (ref 6–20)
CALCIUM SERPL-MCNC: 9.6 MG/DL (ref 8.7–10.5)
CHLORIDE SERPL-SCNC: 105 MMOL/L (ref 95–110)
CLARITY UR REFRACT.AUTO: CLEAR
CO2 SERPL-SCNC: 24 MMOL/L (ref 23–29)
COLOR UR AUTO: NORMAL
CREAT SERPL-MCNC: 0.8 MG/DL (ref 0.5–1.4)
DIFFERENTIAL METHOD: NORMAL
EOSINOPHIL # BLD AUTO: 0.1 K/UL (ref 0–0.5)
EOSINOPHIL NFR BLD: 1.2 % (ref 0–8)
ERYTHROCYTE [DISTWIDTH] IN BLOOD BY AUTOMATED COUNT: 12.4 % (ref 11.5–14.5)
EST. GFR  (AFRICAN AMERICAN): >60 ML/MIN/1.73 M^2
EST. GFR  (NON AFRICAN AMERICAN): >60 ML/MIN/1.73 M^2
GLUCOSE SERPL-MCNC: 85 MG/DL (ref 70–110)
GLUCOSE UR QL STRIP: NEGATIVE
HCT VFR BLD AUTO: 45.9 % (ref 40–54)
HGB BLD-MCNC: 15.1 G/DL (ref 14–18)
HGB UR QL STRIP: NEGATIVE
IMM GRANULOCYTES # BLD AUTO: 0.01 K/UL (ref 0–0.04)
IMM GRANULOCYTES NFR BLD AUTO: 0.1 % (ref 0–0.5)
KETONES UR QL STRIP: NEGATIVE
LEUKOCYTE ESTERASE UR QL STRIP: NEGATIVE
LIPASE SERPL-CCNC: 18 U/L (ref 4–60)
LYMPHOCYTES # BLD AUTO: 2.1 K/UL (ref 1–4.8)
LYMPHOCYTES NFR BLD: 29.8 % (ref 18–48)
MAGNESIUM SERPL-MCNC: 1.8 MG/DL (ref 1.6–2.6)
MCH RBC QN AUTO: 30.2 PG (ref 27–31)
MCHC RBC AUTO-ENTMCNC: 32.9 G/DL (ref 32–36)
MCV RBC AUTO: 92 FL (ref 82–98)
MONOCYTES # BLD AUTO: 0.6 K/UL (ref 0.3–1)
MONOCYTES NFR BLD: 8.9 % (ref 4–15)
NEUTROPHILS # BLD AUTO: 4.1 K/UL (ref 1.8–7.7)
NEUTROPHILS NFR BLD: 59.9 % (ref 38–73)
NITRITE UR QL STRIP: NEGATIVE
NRBC BLD-RTO: 0 /100 WBC
PH UR STRIP: 6 [PH] (ref 5–8)
PLATELET # BLD AUTO: 204 K/UL (ref 150–450)
PMV BLD AUTO: 9.6 FL (ref 9.2–12.9)
POTASSIUM SERPL-SCNC: 4 MMOL/L (ref 3.5–5.1)
PROT SERPL-MCNC: 7.6 G/DL (ref 6–8.4)
PROT UR QL STRIP: NEGATIVE
RBC # BLD AUTO: 5 M/UL (ref 4.6–6.2)
SODIUM SERPL-SCNC: 138 MMOL/L (ref 136–145)
SP GR UR STRIP: 1 (ref 1–1.03)
URN SPEC COLLECT METH UR: NORMAL
WBC # BLD AUTO: 6.88 K/UL (ref 3.9–12.7)

## 2022-02-16 PROCEDURE — 63600175 PHARM REV CODE 636 W HCPCS: Performed by: PHYSICIAN ASSISTANT

## 2022-02-16 PROCEDURE — 25000003 PHARM REV CODE 250: Performed by: PHYSICIAN ASSISTANT

## 2022-02-16 PROCEDURE — 81003 URINALYSIS AUTO W/O SCOPE: CPT | Performed by: PHYSICIAN ASSISTANT

## 2022-02-16 PROCEDURE — 99284 EMERGENCY DEPT VISIT MOD MDM: CPT | Mod: ,,, | Performed by: EMERGENCY MEDICINE

## 2022-02-16 PROCEDURE — 99285 EMERGENCY DEPT VISIT HI MDM: CPT | Mod: 25

## 2022-02-16 PROCEDURE — 96375 TX/PRO/DX INJ NEW DRUG ADDON: CPT

## 2022-02-16 PROCEDURE — 25500020 PHARM REV CODE 255: Performed by: EMERGENCY MEDICINE

## 2022-02-16 PROCEDURE — 96361 HYDRATE IV INFUSION ADD-ON: CPT

## 2022-02-16 PROCEDURE — 83690 ASSAY OF LIPASE: CPT | Performed by: PHYSICIAN ASSISTANT

## 2022-02-16 PROCEDURE — 96374 THER/PROPH/DIAG INJ IV PUSH: CPT

## 2022-02-16 PROCEDURE — 83735 ASSAY OF MAGNESIUM: CPT | Performed by: PHYSICIAN ASSISTANT

## 2022-02-16 PROCEDURE — 85025 COMPLETE CBC W/AUTO DIFF WBC: CPT | Performed by: PHYSICIAN ASSISTANT

## 2022-02-16 PROCEDURE — 80053 COMPREHEN METABOLIC PANEL: CPT | Performed by: PHYSICIAN ASSISTANT

## 2022-02-16 PROCEDURE — 99284 PR EMERGENCY DEPT VISIT,LEVEL IV: ICD-10-PCS | Mod: ,,, | Performed by: EMERGENCY MEDICINE

## 2022-02-16 RX ORDER — MORPHINE SULFATE 4 MG/ML
4 INJECTION, SOLUTION INTRAMUSCULAR; INTRAVENOUS
Status: COMPLETED | OUTPATIENT
Start: 2022-02-16 | End: 2022-02-16

## 2022-02-16 RX ORDER — MAG HYDROX/ALUMINUM HYD/SIMETH 200-200-20
5 SUSPENSION, ORAL (FINAL DOSE FORM) ORAL
Status: COMPLETED | OUTPATIENT
Start: 2022-02-16 | End: 2022-02-16

## 2022-02-16 RX ORDER — PANTOPRAZOLE SODIUM 20 MG/1
20 TABLET, DELAYED RELEASE ORAL DAILY
Qty: 30 TABLET | Refills: 0 | Status: SHIPPED | OUTPATIENT
Start: 2022-02-16 | End: 2022-03-18

## 2022-02-16 RX ORDER — ONDANSETRON 2 MG/ML
4 INJECTION INTRAMUSCULAR; INTRAVENOUS
Status: COMPLETED | OUTPATIENT
Start: 2022-02-16 | End: 2022-02-16

## 2022-02-16 RX ORDER — ONDANSETRON 4 MG/1
4 TABLET, ORALLY DISINTEGRATING ORAL EVERY 6 HOURS PRN
Qty: 15 TABLET | Refills: 0 | Status: SHIPPED | OUTPATIENT
Start: 2022-02-16

## 2022-02-16 RX ADMIN — MORPHINE SULFATE 4 MG: 4 INJECTION INTRAVENOUS at 10:02

## 2022-02-16 RX ADMIN — IOHEXOL 75 ML: 350 INJECTION, SOLUTION INTRAVENOUS at 10:02

## 2022-02-16 RX ADMIN — SODIUM CHLORIDE, SODIUM LACTATE, POTASSIUM CHLORIDE, AND CALCIUM CHLORIDE 1000 ML: .6; .31; .03; .02 INJECTION, SOLUTION INTRAVENOUS at 10:02

## 2022-02-16 RX ADMIN — ALUMINUM HYDROXIDE, MAGNESIUM HYDROXIDE, AND SIMETHICONE 5 ML: 200; 200; 20 SUSPENSION ORAL at 11:02

## 2022-02-16 RX ADMIN — ONDANSETRON 4 MG: 2 INJECTION INTRAMUSCULAR; INTRAVENOUS at 10:02

## 2022-02-16 NOTE — ED NOTES
Wes Lopez, an 51 y.o. male presents to the ED with reports of Generalized abd pain     Review of patient's allergies indicates:  No Known Allergies  Chief Complaint   Patient presents with    Abdominal Pain     With nausea and vomiting     No past medical history on file.       Pt alert and oriented x4, VSS,  Airway intact, respirations even and nonlabored, no bowel or bladder incontinence. +2 pulses to all four extremities, no edema or deformities noted.   Physical Exam  Constitutional:       Appearance: He is not toxic-appearing.  HENT:     Head: Normocephalic and atraumatic.     Mouth/Throat:     Mouth: Mucous membranes are moist.  Eyes:     Extraocular Movements: Extraocular movements intact.     Conjunctiva/sclera: Conjunctivae normal.     Pupils: Pupils are equal, round, and reactive to light.  Neck:     Vascular: No JVD.  Cardiovascular:     Rate and Rhythm: Normal rate and regular rhythm.     Heart sounds: Normal heart sounds. No murmur. No friction rub. No gallop.    Pulmonary:     Effort: Pulmonary effort is normal. No respiratory distress.     Breath sounds: Normal breath sounds. No wheezing or rales.  Abdominal:     General: Bowel sounds are normal. There is no distension.     Palpations: Abdomen is soft.     Tenderness: There is no tenderness reported. There is no guarding or rebound.     Hernia: No hernia is present.  Skin:     General: Skin is warm and dry.     Findings: No rash.  Neurological:     General: No focal deficit present.     Mental Status: He is alert and oriented to person, place, and time.     Cranial Nerves: No cranial nerve deficit.     Sensory: No sensory deficit.

## 2022-02-16 NOTE — DISCHARGE INSTRUCTIONS
[place] el Protonix prescrito según las indicaciones para young dolor abdominal. Use el Zofran recetado según sea necesario para las náuseas y los vómitos.    Catherine un seguimiento con young proveedor de atención primaria para detectar síntomas nuevos, que empeoran o preocupantes.

## 2022-02-16 NOTE — ED PROVIDER NOTES
Encounter Date: 2/16/2022       History     Chief Complaint   Patient presents with    Abdominal Pain     With nausea and vomiting     The history is provided by the patient and medical records. The history is limited by a language barrier. A  was used.      Wes Lopez is a Nepali 51 y.o. male with no reported medical history presenting to the ED with the chief complaint of abdominal pain.    Patient developed epigastric abdominal pain yesterday while at work. Describes the pain as a localized, throbbing sensation. Reports the pain improves with eating. Reports associated N/V/D. Reports 2 episodes of non-bloody emesis and 2 non-bloody loose stools. No history of abdominal surgeries. No fever, chest pain, SOB, urinary symptoms. He tried an OTC heart burn medication without improvement.     Review of patient's allergies indicates:  No Known Allergies  No past medical history on file.  Past Surgical History:   Procedure Laterality Date    CLOSED REDUCTION Right 2/5/2020    Procedure: CLOSED REDUCTION RIGHT SHOULDER;  Surgeon: Malou Surgeon;  Location: [facility];  Service: Anesthesiology;  Laterality: Right;     No family history on file.  Social History     Tobacco Use    Smoking status: Current Every Day Smoker    Smokeless tobacco: Never Used   Substance Use Topics    Alcohol use: No    Drug use: No     Review of Systems   Constitutional: Negative for fever.   HENT: Negative for sore throat.    Eyes: Negative for pain.   Respiratory: Negative for shortness of breath.    Cardiovascular: Negative for chest pain.   Gastrointestinal: Positive for abdominal pain, diarrhea, nausea and vomiting.   Genitourinary: Negative for dysuria.   Musculoskeletal: Negative for back pain.   Skin: Negative for rash.   Neurological: Negative for weakness.   Hematological: Does not bruise/bleed easily.       Physical Exam     Initial Vitals [02/16/22 0934]   BP Pulse Resp Temp SpO2   123/77 73 18 98.5 °F  (36.9 °C) 100 %      MAP       --         Physical Exam    Constitutional: He appears well-developed and well-nourished. He is not diaphoretic. He is easily aroused.   HENT:   Head: Normocephalic and atraumatic.   Mouth/Throat: Oropharynx is clear and moist. No oropharyngeal exudate.   Eyes: EOM and lids are normal. Pupils are equal, round, and reactive to light. No scleral icterus.   Neck: Phonation normal. Neck supple. No stridor present.   Normal range of motion.  Cardiovascular: Normal rate and regular rhythm.   Pulmonary/Chest: Breath sounds normal. No stridor. No respiratory distress. He has no wheezes. He has no rales.   Abdominal: Abdomen is soft. He exhibits no distension. There is abdominal tenderness (epigastric).   No CVA tenderness There is no rebound.   Musculoskeletal:         General: No tenderness or edema. Normal range of motion.      Cervical back: Normal range of motion and neck supple.     Neurological: He is alert, oriented to person, place, and time and easily aroused. He has normal strength. No sensory deficit.   Skin: Skin is warm and dry. No rash noted. No erythema.   Psychiatric: He has a normal mood and affect. His speech is normal.         ED Course   Procedures  Labs Reviewed   CBC W/ AUTO DIFFERENTIAL   COMPREHENSIVE METABOLIC PANEL   URINALYSIS, REFLEX TO URINE CULTURE    Narrative:     Specimen Source->Urine   LIPASE   MAGNESIUM          Imaging Results          CT Abdomen Pelvis With Contrast (Final result)  Result time 02/16/22 11:40:05    Final result by Ivan Lopez MD (02/16/22 11:40:05)                 Impression:      No acute findings in the abdomen or pelvis.      Electronically signed by: Ivan Lopez MD  Date:    02/16/2022  Time:    11:40             Narrative:    EXAMINATION:  CT ABDOMEN PELVIS WITH CONTRAST    CLINICAL HISTORY:  Abdominal abscess/infection suspected;    TECHNIQUE:  Low dose axial images, sagittal and coronal reformations were obtained from the  lung bases to the pubic symphysis following the IV administration of 75 mL of Omnipaque 350 .  Oral contrast was not given.    COMPARISON:  None.    FINDINGS:  Lower Chest:    Lung bases are essentially clear.  Heart size is normal.  No pleural or pericardial effusion.    Abdomen:    Liver is normal in size and contour.  No focal hepatic mass.  Gallbladder is unremarkable. No intrahepatic biliary ductal dilatation.    Spleen, adrenals, and pancreas are unremarkable.    The kidneys are symmetric.  No hydronephrosis. No asymmetric perinephric inflammatory changes.    No small bowel obstruction.  No inflammatory changes identified involving the gastrointestinal tract.  Scattered colonic diverticula without CT findings of acute diverticulitis.  The appendix is normal.    No pneumoperitoneum or organized fluid collection.    No bulky lymphadenopathy.    Abdominal aorta is normal in caliber without significant calcific atherosclerosis.    Portal, splenic, and superior mesenteric veins are patent.    Pelvis:    Urinary bladder is decompressed and not well evaluated.  Rectum is unremarkable.  No significant pelvic free fluid.  No pelvic lymphadenopathy.    Bones and soft tissues:    No aggressive osseous lesions.  Extraperitoneal soft tissues are negative for acute finding.                                 Medications   lactated ringers bolus 1,000 mL (0 mLs Intravenous Stopped 2/16/22 1140)   morphine injection 4 mg (4 mg Intravenous Given 2/16/22 1038)   ondansetron injection 4 mg (4 mg Intravenous Given 2/16/22 1038)   iohexoL (OMNIPAQUE 350) injection 75 mL (75 mLs Intravenous Given 2/16/22 1055)   aluminum-magnesium hydroxide-simethicone 200-200-20 mg/5 mL suspension 5 mL (5 mLs Oral Given 2/16/22 1141)     Medical Decision Making:   History:   Old Medical Records: I decided to obtain old medical records.  Old Records Summarized: records from clinic visits.  Clinical Tests:   Lab Tests: Ordered and  Reviewed  Radiological Study: Ordered and Reviewed       APC / Resident Notes:   51 y.o. male with no reported medical history presenting to the ED c/o 2 days of epigastric abdominal pain with N/V/D. DDx includes but not limited to PUD, dyspepsia, pancreatitis, biliary colic, acute cholecystitis, viral gastroenteritis, hernia.       Laboratory work reviewed without significant findings. CT abd/pelv without acute intra-abdominal pathology. Pain resolved after Maalox and suspect GERD as most likely etiology. Reports drinking several cokes and coffee daily and I provided lifestyle adjustment education. RX for Protonix and Zofran provided. Advised outpatient PCP Follow-up within the next week for re-evaluation. Patient expresses understanding and agreeable to the plan. Return to ED precautions given for new, worsening, or concerning symptoms.       Attending Attestation:             Attending ED Notes:   I was immediately available for consultation, but was not involved in the management of the patient.                Clinical Impression:   Final diagnoses:  [R10.13] Epigastric abdominal pain (Primary)          ED Disposition Condition    Discharge Stable        ED Prescriptions     Medication Sig Dispense Start Date End Date Auth. Provider    pantoprazole (PROTONIX) 20 MG tablet Take 1 tablet (20 mg total) by mouth once daily. 30 tablet 2/16/2022 3/18/2022 Tyler Matos PA-C    ondansetron (ZOFRAN-ODT) 4 MG TbDL Take 1 tablet (4 mg total) by mouth every 6 (six) hours as needed. 15 tablet 2/16/2022  Tyler Matos PA-C        Follow-up Information     Follow up With Specialties Details Why Contact Info Additional Information    [facility]   [address]  [facility] [address]  [phone] [facility] Please park in surface lot and check in at central registration desk           Tyler Matos PA-C  02/16/22 8055       Ketan  PB Triplett MD  02/16/22 1925

## 2022-03-09 ENCOUNTER — PATIENT OUTREACH (OUTPATIENT)
Dept: EMERGENCY MEDICINE | Facility: HOSPITAL | Age: 52
End: 2022-03-09
Payer: OTHER GOVERNMENT

## 2022-03-14 NOTE — PROGRESS NOTES
Attempted to contact patient on 3 separate occasions, patient is unable to reach. ED Navigator to close encounter at this time.      Nohemy Carter, ED Navigator, [facility]  [phone], ext. [phone]

## 2022-04-23 ENCOUNTER — HOSPITAL ENCOUNTER (EMERGENCY)
Facility: HOSPITAL | Age: 52
Discharge: HOME | End: 2022-04-23
Attending: STUDENT IN AN ORGANIZED HEALTH CARE EDUCATION/TRAINING PROGRAM

## 2022-04-23 VITALS
HEIGHT: 68 IN | BODY MASS INDEX: 24.25 KG/M2 | DIASTOLIC BLOOD PRESSURE: 66 MMHG | TEMPERATURE: 98 F | RESPIRATION RATE: 14 BRPM | HEART RATE: 63 BPM | SYSTOLIC BLOOD PRESSURE: 130 MMHG | WEIGHT: 160 LBS | OXYGEN SATURATION: 98 %

## 2022-04-23 DIAGNOSIS — M79.18 MUSCULOSKELETAL PAIN: Primary | ICD-10-CM

## 2022-04-23 LAB
ALBUMIN SERPL BCP-MCNC: 4 G/DL (ref 3.5–5.2)
ALP SERPL-CCNC: 69 U/L (ref 55–135)
ALT SERPL W/O P-5'-P-CCNC: 15 U/L (ref 10–44)
ANION GAP SERPL CALC-SCNC: 7 MMOL/L (ref 8–16)
AST SERPL-CCNC: 16 U/L (ref 10–40)
BASOPHILS # BLD AUTO: 0.02 K/UL (ref 0–0.2)
BASOPHILS NFR BLD: 0.2 % (ref 0–1.9)
BILIRUB SERPL-MCNC: 0.9 MG/DL (ref 0.1–1)
BNP SERPL-MCNC: 13 PG/ML (ref 0–99)
BUN SERPL-MCNC: 11 MG/DL (ref 6–20)
CALCIUM SERPL-MCNC: 9.2 MG/DL (ref 8.7–10.5)
CHLORIDE SERPL-SCNC: 106 MMOL/L (ref 95–110)
CO2 SERPL-SCNC: 27 MMOL/L (ref 23–29)
CREAT SERPL-MCNC: 1.1 MG/DL (ref 0.5–1.4)
DIFFERENTIAL METHOD: ABNORMAL
EOSINOPHIL # BLD AUTO: 0.1 K/UL (ref 0–0.5)
EOSINOPHIL NFR BLD: 1 % (ref 0–8)
ERYTHROCYTE [DISTWIDTH] IN BLOOD BY AUTOMATED COUNT: 12.8 % (ref 11.5–14.5)
EST. GFR  (AFRICAN AMERICAN): >60 ML/MIN/1.73 M^2
EST. GFR  (NON AFRICAN AMERICAN): >60 ML/MIN/1.73 M^2
GLUCOSE SERPL-MCNC: 82 MG/DL (ref 70–110)
HCT VFR BLD AUTO: 41.8 % (ref 40–54)
HGB BLD-MCNC: 13.9 G/DL (ref 14–18)
IMM GRANULOCYTES # BLD AUTO: 0.03 K/UL (ref 0–0.04)
IMM GRANULOCYTES NFR BLD AUTO: 0.3 % (ref 0–0.5)
LYMPHOCYTES # BLD AUTO: 2 K/UL (ref 1–4.8)
LYMPHOCYTES NFR BLD: 17 % (ref 18–48)
MCH RBC QN AUTO: 29.8 PG (ref 27–31)
MCHC RBC AUTO-ENTMCNC: 33.3 G/DL (ref 32–36)
MCV RBC AUTO: 90 FL (ref 82–98)
MONOCYTES # BLD AUTO: 0.7 K/UL (ref 0.3–1)
MONOCYTES NFR BLD: 6.1 % (ref 4–15)
NEUTROPHILS # BLD AUTO: 8.8 K/UL (ref 1.8–7.7)
NEUTROPHILS NFR BLD: 75.4 % (ref 38–73)
NRBC BLD-RTO: 0 /100 WBC
PLATELET # BLD AUTO: 237 K/UL (ref 150–450)
PMV BLD AUTO: 9.8 FL (ref 9.2–12.9)
POTASSIUM SERPL-SCNC: 3.8 MMOL/L (ref 3.5–5.1)
PROT SERPL-MCNC: 7.1 G/DL (ref 6–8.4)
RBC # BLD AUTO: 4.67 M/UL (ref 4.6–6.2)
SODIUM SERPL-SCNC: 140 MMOL/L (ref 136–145)
TROPONIN I SERPL DL<=0.01 NG/ML-MCNC: <0.006 NG/ML (ref 0–0.03)
WBC # BLD AUTO: 11.6 K/UL (ref 3.9–12.7)

## 2022-04-23 PROCEDURE — 93010 ELECTROCARDIOGRAM REPORT: CPT | Mod: ,,, | Performed by: INTERNAL MEDICINE

## 2022-04-23 PROCEDURE — 99285 EMERGENCY DEPT VISIT HI MDM: CPT | Mod: 25

## 2022-04-23 PROCEDURE — 93005 ELECTROCARDIOGRAM TRACING: CPT

## 2022-04-23 PROCEDURE — 93010 EKG 12-LEAD: ICD-10-PCS | Mod: ,,, | Performed by: INTERNAL MEDICINE

## 2022-04-23 PROCEDURE — 83880 ASSAY OF NATRIURETIC PEPTIDE: CPT | Performed by: STUDENT IN AN ORGANIZED HEALTH CARE EDUCATION/TRAINING PROGRAM

## 2022-04-23 PROCEDURE — 85025 COMPLETE CBC W/AUTO DIFF WBC: CPT | Performed by: STUDENT IN AN ORGANIZED HEALTH CARE EDUCATION/TRAINING PROGRAM

## 2022-04-23 PROCEDURE — 25000003 PHARM REV CODE 250: Performed by: STUDENT IN AN ORGANIZED HEALTH CARE EDUCATION/TRAINING PROGRAM

## 2022-04-23 PROCEDURE — 84484 ASSAY OF TROPONIN QUANT: CPT | Performed by: STUDENT IN AN ORGANIZED HEALTH CARE EDUCATION/TRAINING PROGRAM

## 2022-04-23 PROCEDURE — 80053 COMPREHEN METABOLIC PANEL: CPT | Performed by: STUDENT IN AN ORGANIZED HEALTH CARE EDUCATION/TRAINING PROGRAM

## 2022-04-23 RX ORDER — ASPIRIN 325 MG
325 TABLET ORAL
Status: COMPLETED | OUTPATIENT
Start: 2022-04-23 | End: 2022-04-23

## 2022-04-23 RX ORDER — METHOCARBAMOL 750 MG/1
750 TABLET, FILM COATED ORAL
Status: COMPLETED | OUTPATIENT
Start: 2022-04-23 | End: 2022-04-23

## 2022-04-23 RX ORDER — METHOCARBAMOL 500 MG/1
1000 TABLET, FILM COATED ORAL 3 TIMES DAILY PRN
Qty: 30 TABLET | Refills: 0 | Status: SHIPPED | OUTPATIENT
Start: 2022-04-23 | End: 2022-04-28

## 2022-04-23 RX ADMIN — METHOCARBAMOL 750 MG: 750 TABLET ORAL at 12:04

## 2022-04-23 RX ADMIN — ASPIRIN 325 MG ORAL TABLET 325 MG: 325 PILL ORAL at 10:04

## 2022-04-23 NOTE — ED PROVIDER NOTES
"Encounter Date: 2022    SCRIBE #1 NOTE: I, Pedro Hinds, am scribing for, and in the presence of, Cheryl Davenport DO.       History     Chief Complaint   Patient presents with    Chest Pain     Pt states he started with CP yesterday and feeling of strong heartbeat. This morning pt states the pain got worse and now SOB. Pt states pain is L sided 10/10. Pt has no medical Hx and takes no Rx. NKDA. Pt very anxious in triage.     Wes Lopez is a 51 y.o. male who presents to the Emergency Department for evaluation of a constant, sharp, non radiating left pectoral chest pain with associated shortness of breath and chills that began yesterday and worsened this morning. Patient describes that his pain began shortly after smoking a nicotine cigarette. He rates the severity of his pain at the onset of his symptoms at 4/10. He rates his current pain at 9/10. Patient explains that he "feels a vibration in his feet and throughout his body". He denies any nausea, vomiting, fever, or other associated symptoms. Patient states his chest tends to hurt after smoking, but further clarifies his chest has never hurt this much after smoking. Patient attempted treatment with ibuprofen yesterday with no relief. He endorses occasional marijuana and cigarette usage. He denies any other illicit drug use. Patient denies any history of HTN, DM, DVT, or PE. He recalls his father  of a heart attack at 72 years old and expresses concerns he will "die just like his father". Patient notes he has no known drug allergies.    The history is provided by the patient. A  was used ([id number]).     Review of patient's allergies indicates:  No Known Allergies  History reviewed. No pertinent past medical history.  Past Surgical History:   Procedure Laterality Date    CLOSED REDUCTION Right 2020    Procedure: CLOSED REDUCTION RIGHT SHOULDER;  Surgeon: Malou Surgeon;  Location: [facility];  Service: " Anesthesiology;  Laterality: Right;     History reviewed. No pertinent family history.  Social History     Tobacco Use    Smoking status: Current Every Day Smoker    Smokeless tobacco: Never Used   Substance Use Topics    Alcohol use: No    Drug use: No     Review of Systems   Constitutional: Positive for chills. Negative for fever.   HENT: Negative for drooling, facial swelling and trouble swallowing.    Eyes: Negative for redness and visual disturbance.   Respiratory: Positive for shortness of breath. Negative for cough and stridor.    Cardiovascular: Positive for chest pain.   Gastrointestinal: Negative for abdominal pain, nausea and vomiting.   Genitourinary: Negative for dysuria and hematuria.   Musculoskeletal: Negative for gait problem and neck stiffness.   Skin: Negative for pallor and wound.   Neurological: Negative for syncope, facial asymmetry, speech difficulty and weakness.   Psychiatric/Behavioral: Negative for confusion.   All other systems reviewed and are negative.      Physical Exam     Initial Vitals [04/23/22 0950]   BP Pulse Resp Temp SpO2   (!) 177/81 68 (!) 24 97.9 °F (36.6 °C) 98 %      MAP       --         Physical Exam    Nursing note and vitals reviewed.  Constitutional: Vital signs are normal. He appears well-developed and well-nourished. He is not diaphoretic.  Non-toxic appearance. He does not have a sickly appearance. He does not appear ill.   HENT:   Head: Normocephalic and atraumatic.   Mouth/Throat: Uvula is midline, oropharynx is clear and moist and mucous membranes are normal.   Eyes: Conjunctivae and EOM are normal. Pupils are equal, round, and reactive to light.   Neck: Neck supple. No JVD present.   Normal range of motion.  Cardiovascular: Normal rate, regular rhythm, normal heart sounds and intact distal pulses.   Pulmonary/Chest: Breath sounds normal. No respiratory distress. He exhibits tenderness.   Left anterior chest wall tenderness to palpation. No obvious signs of  chest wall deformity. No overlying skin changes or crepitus.   Abdominal: Abdomen is soft. He exhibits no distension. There is no abdominal tenderness. There is no rebound and no guarding.   Musculoskeletal:      Cervical back: Normal range of motion and neck supple. No rigidity. No spinous process tenderness.     Neurological: He is alert and oriented to person, place, and time. He has normal strength.   Moves all extremities, follows all commands, no focal deficits.    Skin: Skin is warm and dry. No rash noted. No erythema.   Psychiatric: His mood appears anxious.         ED Course   Procedures  Labs Reviewed   CBC W/ AUTO DIFFERENTIAL - Abnormal; Notable for the following components:       Result Value    Hemoglobin 13.9 (*)     Gran # (ANC) 8.8 (*)     Gran % 75.4 (*)     Lymph % 17.0 (*)     All other components within normal limits   COMPREHENSIVE METABOLIC PANEL - Abnormal; Notable for the following components:    Anion Gap 7 (*)     All other components within normal limits   TROPONIN I   B-TYPE NATRIURETIC PEPTIDE     EKG Readings: (Independently Interpreted)   Normal sinus rhythm with a rate of 62 bpm, normal axis and intervals, no obvious acute ST segment changes. No old EKG in [facility] to compare     ECG Results          EKG 12-lead (Final result)  Result time 04/24/22 12:11:18    Final result by Interface, Lab In [facility] (04/24/22 12:11:18)                 Narrative:    Test Reason : R07.9,    Vent. Rate : 062 BPM     Atrial Rate : 062 BPM     P-R Int : 184 ms          QRS Dur : 088 ms      QT Int : 374 ms       P-R-T Axes : 057 083 030 degrees     QTc Int : 379 ms    Normal sinus rhythm  Normal ECG  No previous ECGs available  Confirmed by Rachid Ferrell MD ([id number]) on 4/24/2022 12:11:10 PM    Referred By: [email or web address]   SELF           Confirmed By:Rachid Ferrell MD                            Imaging Results          X-Ray Chest AP Portable (Final result)  Result time 04/23/22 10:44:02    Final result by Karthik  JULIENNE Hidalgo MD (04/23/22 10:44:02)                 Impression:      Ill-defined left lower lung zone interstitial and airspace opacities, possibly edema or infectious/inflammatory disease.      Electronically signed by: Karthik Hidalgo MD  Date:    04/23/2022  Time:    10:44             Narrative:    EXAMINATION:  XR CHEST AP PORTABLE    CLINICAL HISTORY:  Chest Pain;    TECHNIQUE:  Single frontal view of the chest was performed.    COMPARISON:  None    FINDINGS:  Ill-defined left lower lung zone interstitial and airspace opacities.  Right lung is clear.  No focal consolidation.    No effusion or pneumothorax.    No acute bone abnormality.                                 Medications   aspirin tablet 325 mg (325 mg Oral Given 4/23/22 1019)   methocarbamoL tablet 750 mg (750 mg Oral Given 4/23/22 1216)     Medical Decision Making:   History:   Old Medical Records: I decided to obtain old medical records.  Clinical Tests:   Lab Tests: Ordered and Reviewed  Radiological Study: Reviewed and Ordered  Medical Tests: Ordered and Reviewed  ED Management:   [facility]  This is an emergent evaluation of a 51 y.o. male who presents to the Emergency Department for evaluation of a constant, sharp, non radiating left pectoral chest pain with associated shortness of breath and chills that began yesterday and worsened this morning. Initial vitals in the ED with RR 24, blood pressure 177/81 and remainder of vitals unremarkable. Physical exam noted above. DDx includes but is not limited to musculoskeletal pain, muscle strain, anxiety, ACS, pleural effusion, pneumonia, CHF, COPD. Also considered but clinically less likely to be dissection, tamponade, PE. PERC negative. Labs and imaging including a cardiac work-up was obtained. Labs within normal limits. Chest xray shows an ill-defined left lower lung zone interstitial and airspace opacities with no focal consolidation. EKG without acute ischemic changes. He was treated with ASA and Robaxin. On  reassessment, his symptoms were resolved. Given benign exam and work-up, I feel symptoms more consistent with musculoskeletal pain. Will discharge with Robaxin, PCP follow-up and ED return precautions. Patient is aware of plan and is amenable.     Cheryl Davenport D.O  EMERGENCY MEDICINE  1:02 PM 04/23/2022          Scribe Attestation:   Scribe #1: I performed the above scribed service and the documentation accurately describes the services I performed. I attest to the accuracy of the note.                 Clinical Impression:   Final diagnoses:  [M79.18] Musculoskeletal pain (Primary)          ED Disposition Condition    Discharge Stable        ED Prescriptions     Medication Sig Dispense Start Date End Date Auth. Provider    methocarbamoL (ROBAXIN) 500 MG Tab (Expires today) Take 2 tablets (1,000 mg total) by mouth 3 (three) times daily as needed (muscle pain). 30 tablet 4/23/2022 4/28/2022 Cheryl Davenport DO        Follow-up Information     Follow up With Specialties Details Why Contact [facility] Emergency Dept Emergency Medicine Go to  If symptoms worsen [facility] [id number] [phone]    [facility]  Schedule an appointment as soon as possible for a visit in 1 week Emergency Room Follow-up, to establish with a primary care physician [address]  [facility] [address]  [phone]           I, Cheryl Davenport DO, personally performed the services described in this documentation. All medical record entries made by the scribe were at my direction and in my presence. I have reviewed the chart and agree that the record reflects my personal performance and is accurate and complete.       Cheryl Davenport DO  04/28/22 0056

## 2022-04-23 NOTE — ED NOTES
Pt left wallet. Wallet was given to  by JULIO Graves. Pt called and notified that wallet is with security. Pt verbalized understanding and states he is on his way to get wallet.

## 2022-10-29 ENCOUNTER — HOSPITAL ENCOUNTER (EMERGENCY)
Facility: HOSPITAL | Age: 52
Discharge: HOME | End: 2022-10-30
Attending: EMERGENCY MEDICINE

## 2022-10-29 DIAGNOSIS — R07.9 CHEST PAIN: ICD-10-CM

## 2022-10-29 DIAGNOSIS — R07.89 CHEST WALL PAIN: Primary | ICD-10-CM

## 2022-10-29 LAB
ALBUMIN SERPL BCP-MCNC: 4 G/DL (ref 3.5–5.2)
ALP SERPL-CCNC: 72 U/L (ref 55–135)
ALT SERPL W/O P-5'-P-CCNC: 18 U/L (ref 10–44)
ANION GAP SERPL CALC-SCNC: 9 MMOL/L (ref 8–16)
AST SERPL-CCNC: 18 U/L (ref 10–40)
BASOPHILS # BLD AUTO: 0.03 K/UL (ref 0–0.2)
BASOPHILS NFR BLD: 0.3 % (ref 0–1.9)
BILIRUB SERPL-MCNC: 0.5 MG/DL (ref 0.1–1)
BUN SERPL-MCNC: 10 MG/DL (ref 6–20)
CALCIUM SERPL-MCNC: 10.1 MG/DL (ref 8.7–10.5)
CHLORIDE SERPL-SCNC: 105 MMOL/L (ref 95–110)
CO2 SERPL-SCNC: 24 MMOL/L (ref 23–29)
CREAT SERPL-MCNC: 1 MG/DL (ref 0.5–1.4)
D DIMER PPP IA.FEU-MCNC: <0.19 MG/L FEU
DIFFERENTIAL METHOD: ABNORMAL
EOSINOPHIL # BLD AUTO: 0.1 K/UL (ref 0–0.5)
EOSINOPHIL NFR BLD: 1.1 % (ref 0–8)
ERYTHROCYTE [DISTWIDTH] IN BLOOD BY AUTOMATED COUNT: 13.1 % (ref 11.5–14.5)
EST. GFR  (NO RACE VARIABLE): >60 ML/MIN/1.73 M^2
GLUCOSE SERPL-MCNC: 83 MG/DL (ref 70–110)
HCT VFR BLD AUTO: 41.1 % (ref 40–54)
HGB BLD-MCNC: 13.7 G/DL (ref 14–18)
IMM GRANULOCYTES # BLD AUTO: 0.04 K/UL (ref 0–0.04)
IMM GRANULOCYTES NFR BLD AUTO: 0.4 % (ref 0–0.5)
LIPASE SERPL-CCNC: 27 U/L (ref 4–60)
LYMPHOCYTES # BLD AUTO: 3.1 K/UL (ref 1–4.8)
LYMPHOCYTES NFR BLD: 30.7 % (ref 18–48)
MAGNESIUM SERPL-MCNC: 1.9 MG/DL (ref 1.6–2.6)
MCH RBC QN AUTO: 30.2 PG (ref 27–31)
MCHC RBC AUTO-ENTMCNC: 33.3 G/DL (ref 32–36)
MCV RBC AUTO: 91 FL (ref 82–98)
MONOCYTES # BLD AUTO: 0.9 K/UL (ref 0.3–1)
MONOCYTES NFR BLD: 9.3 % (ref 4–15)
NEUTROPHILS # BLD AUTO: 5.8 K/UL (ref 1.8–7.7)
NEUTROPHILS NFR BLD: 58.2 % (ref 38–73)
NRBC BLD-RTO: 0 /100 WBC
PLATELET # BLD AUTO: 211 K/UL (ref 150–450)
PMV BLD AUTO: 10.1 FL (ref 9.2–12.9)
POTASSIUM SERPL-SCNC: 4.4 MMOL/L (ref 3.5–5.1)
PROT SERPL-MCNC: 7.2 G/DL (ref 6–8.4)
RBC # BLD AUTO: 4.54 M/UL (ref 4.6–6.2)
SODIUM SERPL-SCNC: 138 MMOL/L (ref 136–145)
TROPONIN I SERPL DL<=0.01 NG/ML-MCNC: <0.006 NG/ML (ref 0–0.03)
WBC # BLD AUTO: 9.92 K/UL (ref 3.9–12.7)

## 2022-10-29 PROCEDURE — 99285 EMERGENCY DEPT VISIT HI MDM: CPT | Mod: 25

## 2022-10-29 PROCEDURE — 83735 ASSAY OF MAGNESIUM: CPT | Performed by: PHYSICIAN ASSISTANT

## 2022-10-29 PROCEDURE — 85025 COMPLETE CBC W/AUTO DIFF WBC: CPT | Performed by: PHYSICIAN ASSISTANT

## 2022-10-29 PROCEDURE — 93010 EKG 12-LEAD: ICD-10-PCS | Mod: ,,, | Performed by: INTERNAL MEDICINE

## 2022-10-29 PROCEDURE — 84484 ASSAY OF TROPONIN QUANT: CPT | Performed by: PHYSICIAN ASSISTANT

## 2022-10-29 PROCEDURE — 93010 ELECTROCARDIOGRAM REPORT: CPT | Mod: ,,, | Performed by: INTERNAL MEDICINE

## 2022-10-29 PROCEDURE — 99284 EMERGENCY DEPT VISIT MOD MDM: CPT | Mod: ,,, | Performed by: PHYSICIAN ASSISTANT

## 2022-10-29 PROCEDURE — 99284 PR EMERGENCY DEPT VISIT,LEVEL IV: ICD-10-PCS | Mod: ,,, | Performed by: PHYSICIAN ASSISTANT

## 2022-10-29 PROCEDURE — 25000003 PHARM REV CODE 250: Performed by: PHYSICIAN ASSISTANT

## 2022-10-29 PROCEDURE — 96374 THER/PROPH/DIAG INJ IV PUSH: CPT

## 2022-10-29 PROCEDURE — 80053 COMPREHEN METABOLIC PANEL: CPT | Performed by: PHYSICIAN ASSISTANT

## 2022-10-29 PROCEDURE — 83690 ASSAY OF LIPASE: CPT | Performed by: PHYSICIAN ASSISTANT

## 2022-10-29 PROCEDURE — 63600175 PHARM REV CODE 636 W HCPCS: Performed by: PHYSICIAN ASSISTANT

## 2022-10-29 PROCEDURE — 94761 N-INVAS EAR/PLS OXIMETRY MLT: CPT

## 2022-10-29 PROCEDURE — 93005 ELECTROCARDIOGRAM TRACING: CPT

## 2022-10-29 PROCEDURE — 85379 FIBRIN DEGRADATION QUANT: CPT | Performed by: PHYSICIAN ASSISTANT

## 2022-10-29 PROCEDURE — 83880 ASSAY OF NATRIURETIC PEPTIDE: CPT | Performed by: PHYSICIAN ASSISTANT

## 2022-10-29 RX ORDER — KETOROLAC TROMETHAMINE 30 MG/ML
15 INJECTION, SOLUTION INTRAMUSCULAR; INTRAVENOUS
Status: COMPLETED | OUTPATIENT
Start: 2022-10-29 | End: 2022-10-29

## 2022-10-29 RX ORDER — ASPIRIN 325 MG
325 TABLET ORAL
Status: COMPLETED | OUTPATIENT
Start: 2022-10-29 | End: 2022-10-29

## 2022-10-29 RX ADMIN — ASPIRIN 325 MG ORAL TABLET 325 MG: 325 PILL ORAL at 10:10

## 2022-10-29 RX ADMIN — KETOROLAC TROMETHAMINE 15 MG: 30 INJECTION, SOLUTION INTRAMUSCULAR; INTRAVENOUS at 11:10

## 2022-10-30 VITALS
OXYGEN SATURATION: 99 % | RESPIRATION RATE: 20 BRPM | TEMPERATURE: 98 F | DIASTOLIC BLOOD PRESSURE: 72 MMHG | HEART RATE: 70 BPM | SYSTOLIC BLOOD PRESSURE: 148 MMHG

## 2022-10-30 LAB
BNP SERPL-MCNC: <10 PG/ML (ref 0–99)
TROPONIN I SERPL DL<=0.01 NG/ML-MCNC: <0.006 NG/ML (ref 0–0.03)

## 2022-10-30 PROCEDURE — 84484 ASSAY OF TROPONIN QUANT: CPT | Performed by: PHYSICIAN ASSISTANT

## 2022-10-30 RX ORDER — LIDOCAINE 50 MG/G
1 PATCH TOPICAL DAILY
Qty: 15 PATCH | Refills: 0 | Status: SHIPPED | OUTPATIENT
Start: 2022-10-30

## 2022-10-30 RX ORDER — IBUPROFEN 800 MG/1
800 TABLET ORAL EVERY 6 HOURS PRN
Qty: 20 TABLET | Refills: 0 | Status: SHIPPED | OUTPATIENT
Start: 2022-10-30

## 2022-10-30 NOTE — DISCHARGE INSTRUCTIONS
Take medications prescribed to use a heat pack to the affected region.  Follow-up with your primary care doctor

## 2022-10-30 NOTE — ED TRIAGE NOTES
Wes Lopez, a 51 y.o. male presents to the ED w/ complaint of     Triage note:  Chief Complaint   Patient presents with    Chest Pain     Midsternal, non radiating chest pain and SOB starting around 2100. Describes pain as a stabbing. Hx of HTN     Review of patient's allergies indicates:  No Known Allergies  No past medical history on file.

## 2022-10-30 NOTE — PROVIDER PROGRESS NOTES - EMERGENCY DEPT.
Encounter Date: 10/29/2022    ED Physician Progress Notes        Physician Note:   ED Physician Hand-off Note:    ED Course: I assumed care of patient from off-going ED physician team. Briefly, Patient is a 51 M here for chest pain.  Initial trop negative.    At the time of signout plan was pending repeat troponin.    Medications given in the ED:    Medications  aspirin tablet 325 mg (325 mg Oral Given 10/29/22 8926)  ketorolac injection 15 mg (15 mg Intravenous Given 10/29/22 0561)      Disposition: discharge    Patient comfortable with plan. Patient counseled regarding exam, results, diagnosis, treatment, and plan.    Impression: chest pain    RACHEL Ewing MD  Staff ED Physician  10/30/2022 2:54 AM

## 2022-10-30 NOTE — ED PROVIDER NOTES
Encounter Date: 10/29/2022       History     Chief Complaint   Patient presents with    Chest Pain     Midsternal, non radiating chest pain and SOB starting around 2100. Describes pain as a stabbing. Hx of HTN     51-year-old Faroese-speaking male presents ER for evaluation of left-sided chest discomfort.  Patient states that he was smoking marijuana at around 5:00 p.m. today when he developed left-sided chest discomfort.  He states he also had shortness of breath at the time.  He told his friends that he was smoking with that he developed chest pain therefore they gave him a muscle relaxant.  He states he took half of a 750 mg tablet.  He does not know the name of the muscle relaxant he took.  Reports that since then he has had worsening left-sided chest discomfort.  No prior history of cardiac stent placement.  No history of PE or DVT.  No recent long travel or hospitalization.  He did not take aspirin prior to arrival to ER today.  Patient does report mild cough that has been ongoing for last couple days.  No fevers or chills at home otherwise.  Reports alcohol use at around 1:30 p.m. today, 1 corona.    The history is provided by the patient. A  was used (Translating service use-[id number]).   Review of patient's allergies indicates:  No Known Allergies  No past medical history on file.  Past Surgical History:   Procedure Laterality Date    CLOSED REDUCTION Right 2/5/2020    Procedure: CLOSED REDUCTION RIGHT SHOULDER;  Surgeon: Malou Surgeon;  Location: [facility];  Service: Anesthesiology;  Laterality: Right;     No family history on file.  Social History     Tobacco Use    Smoking status: Every Day    Smokeless tobacco: Never   Substance Use Topics    Alcohol use: No    Drug use: No     Review of Systems   Constitutional:  Negative for chills and fever.   HENT:  Negative for congestion.    Eyes:  Negative for visual disturbance.   Respiratory:  Negative for shortness of breath.    Cardiovascular:   Positive for chest pain.   Gastrointestinal:  Negative for nausea and vomiting.   Genitourinary:  Negative for dysuria and flank pain.   Musculoskeletal:  Positive for myalgias.   Skin:  Negative for rash.   Allergic/Immunologic: Negative for immunocompromised state.   Neurological:  Negative for weakness and numbness.   Hematological:  Does not bruise/bleed easily.   Psychiatric/Behavioral:  Negative for confusion.      Physical Exam     Initial Vitals [10/29/22 2149]   BP Pulse Resp Temp SpO2   (!) 161/83 67 20 98.2 °F (36.8 °C) 97 %      MAP       --         Physical Exam    Vitals reviewed.  Constitutional: He appears well-developed and well-nourished. He is not diaphoretic. No distress.   HENT:   Head: Normocephalic and atraumatic.   Eyes: Conjunctivae and EOM are normal. Pupils are equal, round, and reactive to light.   Neck: Neck supple.   Cardiovascular:  Normal rate, regular rhythm, normal heart sounds and intact distal pulses.           Pulmonary/Chest: Breath sounds normal. No respiratory distress. He exhibits tenderness (left anterior chest wall, pectoralis). He exhibits no bony tenderness, no edema and no retraction.   Abdominal: He exhibits no distension. There is no abdominal tenderness. There is no rebound and no guarding.   Musculoskeletal:         General: Normal range of motion.      Cervical back: Neck supple.     Neurological: He is alert and oriented to person, place, and time.   Skin: Skin is warm.       ED Course   Procedures  Labs Reviewed   CBC W/ AUTO DIFFERENTIAL - Abnormal; Notable for the following components:       Result Value    RBC 4.54 (*)     Hemoglobin 13.7 (*)     All other components within normal limits   COMPREHENSIVE METABOLIC PANEL   LIPASE   MAGNESIUM   TROPONIN I   B-TYPE NATRIURETIC PEPTIDE   D DIMER, QUANTITATIVE   TROPONIN I          Imaging Results              X-Ray Chest PA And Lateral (Final result)  Result time 10/29/22 23:48:11   Procedure changed from X-Ray  Chest AP Portable     Final result by Walter Ashley MD (10/29/22 23:48:11)                   Impression:      No radiographic evidence of acute intrathoracic process.      Electronically signed by: Walter Ashley MD  Date:    10/29/2022  Time:    23:48               Narrative:    EXAMINATION:  XR CHEST PA AND LATERAL    CLINICAL HISTORY:  Chest Pain ; Chest pain, unspecified    TECHNIQUE:  PA and lateral views of the chest were performed.    COMPARISON:  04/23/2022    FINDINGS:  The cardiomediastinal silhouette appears within normal limits.  The lungs are symmetrically aerated without evidence of focal airspace consolidation.  There is no pleural effusion or pneumothorax.  Visualized osseous structures appear intact.                                       Medications   aspirin tablet 325 mg (325 mg Oral Given 10/29/22 2248)   ketorolac injection 15 mg (15 mg Intravenous Given 10/29/22 2309)        Additional MDM:   PERC Rule:   Age is greater than or equal to 50 = 1.0  Heart Rate is greater than or equal to 100 = 0.0  SaO2 on room air < 95% = 0.0  Unilateral leg swelling = 0.0  Hemoptysis = 0.0  Recent surgery or trauma = 0.0  Prior PE or DVT =  0.0  Hormone use = 0.00  PERC Score = 1  Heart Score:    History:          Slightly suspicious.  ECG:             Normal  Age:               45-65 years  Risk factors: 1-2 risk factors  Troponin:       Less than or equal to normal limit  Final Score: 2          ED Course as of 10/30/22 0135   Sat Oct 29, 2022   [id number] [AJ]   Sun Oct 30, 2022   0028 Laboratory studies unremarkable.  Normal white count of 9.9.  Hemoglobin is stable.  Chemistries unremarkable. Cardiac workup was essentially unremarkable with normal troponin, BN P.  D-dimer also found to be unremarkable.  X-ray of chest is normal.  No evidence of acute pathology.     [AJ]   0029 Given the patient's sinuses on palpation to the left anterior pectoralis suspect symptoms secondary to musculoskeletal  etiology. [AJ]   0029 We will obtain repeat troponin.  Heart score of 2, [AJ]   0029 Likely disposition to discharge home with follow-up with primary care physician and Cardiology as needed. [AJ]   0135 Patient pending repeat troponin at the end of my shift.  Case was signed out to Dr. Ewing for final disposition. [AJ]      ED Course User Index  [AJ] Marian Breaux PA-C                 Clinical Impression:   Final diagnoses:  [R07.9] Chest pain  [R07.89] Chest wall pain (Primary)               Marian Breaux PA-C  10/30/22 0136

## 2024-04-08 ENCOUNTER — HOSPITAL ENCOUNTER (EMERGENCY)
Facility: HOSPITAL | Age: 54
Discharge: HOME | End: 2024-04-08
Attending: EMERGENCY MEDICINE

## 2024-04-08 VITALS
BODY MASS INDEX: 25.11 KG/M2 | HEIGHT: 67 IN | DIASTOLIC BLOOD PRESSURE: 73 MMHG | TEMPERATURE: 98 F | RESPIRATION RATE: 20 BRPM | SYSTOLIC BLOOD PRESSURE: 118 MMHG | WEIGHT: 160 LBS | OXYGEN SATURATION: 97 % | HEART RATE: 70 BPM

## 2024-04-08 DIAGNOSIS — R51.9 FRONTAL HEADACHE: ICD-10-CM

## 2024-04-08 DIAGNOSIS — J34.89 RHINORRHEA: ICD-10-CM

## 2024-04-08 DIAGNOSIS — J06.9 VIRAL URI WITH COUGH: Primary | ICD-10-CM

## 2024-04-08 DIAGNOSIS — R09.81 NASAL CONGESTION: ICD-10-CM

## 2024-04-08 LAB
CTP QC/QA: YES
MOLECULAR STREP A: NEGATIVE
POC MOLECULAR INFLUENZA A AGN: NEGATIVE
POC MOLECULAR INFLUENZA B AGN: NEGATIVE
SARS-COV-2 RDRP RESP QL NAA+PROBE: NEGATIVE

## 2024-04-08 PROCEDURE — 99284 EMERGENCY DEPT VISIT MOD MDM: CPT | Mod: 25

## 2024-04-08 PROCEDURE — 87651 STREP A DNA AMP PROBE: CPT

## 2024-04-08 PROCEDURE — 87502 INFLUENZA DNA AMP PROBE: CPT

## 2024-04-08 PROCEDURE — 63600175 PHARM REV CODE 636 W HCPCS

## 2024-04-08 PROCEDURE — 87635 SARS-COV-2 COVID-19 AMP PRB: CPT | Performed by: EMERGENCY MEDICINE

## 2024-04-08 PROCEDURE — 96372 THER/PROPH/DIAG INJ SC/IM: CPT

## 2024-04-08 RX ORDER — CETIRIZINE HYDROCHLORIDE 10 MG/1
10 TABLET ORAL DAILY PRN
Qty: 30 TABLET | Refills: 0 | Status: SHIPPED | OUTPATIENT
Start: 2024-04-08

## 2024-04-08 RX ORDER — NAPROXEN 500 MG/1
500 TABLET ORAL 2 TIMES DAILY PRN
Qty: 30 TABLET | Refills: 0 | Status: SHIPPED | OUTPATIENT
Start: 2024-04-08

## 2024-04-08 RX ORDER — FLUTICASONE PROPIONATE 50 MCG
1 SPRAY, SUSPENSION (ML) NASAL 2 TIMES DAILY PRN
Qty: 15 G | Refills: 0 | Status: SHIPPED | OUTPATIENT
Start: 2024-04-08

## 2024-04-08 RX ORDER — PROMETHAZINE HYDROCHLORIDE AND DEXTROMETHORPHAN HYDROBROMIDE 6.25; 15 MG/5ML; MG/5ML
5 SYRUP ORAL EVERY 4 HOURS PRN
Qty: 180 ML | Refills: 0 | Status: SHIPPED | OUTPATIENT
Start: 2024-04-08

## 2024-04-08 RX ORDER — BENZONATATE 100 MG/1
100 CAPSULE ORAL 3 TIMES DAILY PRN
Qty: 30 CAPSULE | Refills: 0 | Status: SHIPPED | OUTPATIENT
Start: 2024-04-08

## 2024-04-08 RX ORDER — KETOROLAC TROMETHAMINE 30 MG/ML
15 INJECTION, SOLUTION INTRAMUSCULAR; INTRAVENOUS
Status: COMPLETED | OUTPATIENT
Start: 2024-04-08 | End: 2024-04-08

## 2024-04-08 RX ADMIN — KETOROLAC TROMETHAMINE 15 MG: 30 INJECTION, SOLUTION INTRAMUSCULAR at 10:04

## 2024-04-08 NOTE — ED PROVIDER NOTES
Encounter Date: 4/8/2024    SCRIBE #1 NOTE: I, Philbeltran Moreno, am scribing for, and in the presence of,  Holdsworth, Alayna, PA-C.       History     Chief Complaint   Patient presents with    Headache     Reports HA, fever, cough since x2 days. Denies meds pta.      53 y.o. male with no pertinent PMHx, presents to the ED for evaluation of URI symptoms that started 3 days ago. Patient reports of HA, cough, congestion, rhinorrhea, subjective fever, sore throat, generalized myalgias, and chest wall pain when coughing. Further reports of constipation, with LBM 1 day ago. Attempted tx with reported ampicillin 1 day ago. Denies nausea, vomiting, diarrhea, SOB, or any associated symptoms. NKDA.  No known sick contacts.    The history is provided by the patient. The history is limited by a language barrier. A  was used ([id number]).     Review of patient's allergies indicates:  No Known Allergies  History reviewed. No pertinent past medical history.  Past Surgical History:   Procedure Laterality Date    CLOSED REDUCTION Right 2/5/2020    Procedure: CLOSED REDUCTION RIGHT SHOULDER;  Surgeon: Malou Surgeon;  Location: [facility];  Service: Anesthesiology;  Laterality: Right;     History reviewed. No pertinent family history.  Social History     Tobacco Use    Smoking status: Every Day    Smokeless tobacco: Never   Substance Use Topics    Alcohol use: No    Drug use: No     Review of Systems   Constitutional:  Positive for fever (Subjective). Negative for chills and diaphoresis.   HENT:  Positive for congestion, rhinorrhea and sore throat. Negative for trouble swallowing.    Respiratory:  Positive for cough. Negative for shortness of breath.    Cardiovascular:  Positive for chest pain (Chest wall with coughing).   Gastrointestinal:  Positive for constipation. Negative for abdominal pain, diarrhea, nausea and vomiting.   Genitourinary:  Negative for decreased urine volume, difficulty urinating, dysuria, frequency,  hematuria and urgency.   Musculoskeletal:  Positive for myalgias (Generalized body aches). Negative for arthralgias.   Skin:  Negative for rash.   Neurological:  Positive for headaches. Negative for dizziness, syncope and light-headedness.       Physical Exam     Initial Vitals [04/08/24 0928]   BP Pulse Resp Temp SpO2   118/73 70 20 97.9 °F (36.6 °C) 97 %      MAP       --         Physical Exam    Nursing note and vitals reviewed.  Constitutional: Vital signs are normal. He appears well-developed and well-nourished. He is not diaphoretic. He is active. He does not appear ill. No distress.   HENT:   Head: Normocephalic and atraumatic.   Right Ear: External ear normal.   Left Ear: External ear normal.   Mouth/Throat: Uvula is midline and mucous membranes are normal. Posterior oropharyngeal erythema present. No oropharyngeal exudate, posterior oropharyngeal edema or tonsillar abscesses.   Nasal congestion. No drooling, no hot potato voice, no neck edema or erythema, and normal neck ROM.   Eyes: Conjunctivae and lids are normal. Pupils are equal, round, and reactive to light. Right eye exhibits no discharge. Left eye exhibits no discharge. No scleral icterus.   Neck: Phonation normal. Neck supple.   Normal range of motion.   Full passive range of motion without pain.     Cardiovascular:  Normal rate and regular rhythm.           Pulmonary/Chest: Effort normal and breath sounds normal. No respiratory distress.   Abdominal: He exhibits no distension.   Musculoskeletal:         General: Normal range of motion.      Cervical back: Full passive range of motion without pain, normal range of motion and neck supple.     Neurological: He is alert and oriented to person, place, and time. GCS eye subscore is 4. GCS verbal subscore is 5. GCS motor subscore is 6.   Skin: Skin is dry. Capillary refill takes less than 2 seconds.         ED Course   Procedures  Labs Reviewed   POCT INFLUENZA A/B MOLECULAR   SARS-COV-2 RDRP GENE   POCT  STREP A MOLECULAR          Imaging Results    None          Medications   ketorolac injection 15 mg (15 mg Intramuscular Given 4/8/24 1025)     Medical Decision Making  53 y.o. male with no pertinent PMHx, presents to the ED for evaluation of URI symptoms that started 3 days ago.   Patient's chart and medical history reviewed.    Ddx:  COVID  Flu  Strep throat  Viral URI    Patient's vitals reviewed.  Afebrile, no respiratory distress, and nontoxic-appearing in the ED. patient had nasal congestion and erythematous throat on exam, otherwise unremarkable.  Patient given Toradol for pain.  Patient's COVID, flu, and strep negative. Discussed with patient this is most likely a viral upper respiratory infection which will take time to clear from his system.  Discussed with patient to stay well rested and hydrated. Patient given will be sent home on naproxen, Flonase, Zyrtec, Tessalon Perles, benzocaine lozenges, and promethazine DM cough syrup for symptomatic control.  Patient agrees with this plan. Discussed with him strict return precautions, he verbalized understanding. Patient is stable for discharge.     Amount and/or Complexity of Data Reviewed  Labs: ordered. Decision-making details documented in ED Course.    Risk  OTC drugs.  Prescription drug management.            Scribe Attestation:   Scribe #1: I performed the above scribed service and the documentation accurately describes the services I performed. I attest to the accuracy of the note.                               I, Alayna Holdsworth,PA-C, personally performed the services described in this documentation. All medical record entries made by the scribe were at my direction and in my presence. I have reviewed the chart and agree that the record reflects my personal performance and is accurate and complete.            Clinical Impression:  Final diagnoses:  [J06.9] Viral URI with cough (Primary)  [R09.81] Nasal congestion  [J34.89] Rhinorrhea  [R51.9] Frontal  headache          ED Disposition Condition    Discharge Stable          ED Prescriptions       Medication Sig Dispense Start Date End Date Auth. Provider    benzocaine-menthoL 6-10 mg lozenge Take 1 lozenge by mouth every 2 (two) hours as needed for Pain (sore throat). 18 tablet 4/8/2024 -- Holdsworth, Alayna, PA-C    cetirizine (ZYRTEC) 10 MG tablet Take 1 tablet (10 mg total) by mouth daily as needed for Allergies or Rhinitis. 30 tablet 4/8/2024 -- Holdsworth, Alayna, PA-C    fluticasone propionate (FLONASE) 50 mcg/actuation nasal spray 1 spray (50 mcg total) by Each Nostril route 2 (two) times daily as needed for Rhinitis or Allergies. 15 g 4/8/2024 -- Holdsworth, Alayna, PA-C    benzonatate (TESSALON) 100 MG capsule Take 1 capsule (100 mg total) by mouth 3 (three) times daily as needed for Cough. 30 capsule 4/8/2024 -- Holdsworth, Alayna, PA-C    promethazine-dextromethorphan (PROMETHAZINE-DM) 6.25-15 mg/5 mL Syrp Take 5 mLs by mouth every 4 (four) hours as needed (cough). 180 mL 4/8/2024 -- Holdsworth, Alayna, PA-C    naproxen (NAPROSYN) 500 MG tablet Take 1 tablet (500 mg total) by mouth 2 (two) times daily as needed (Pain). 30 tablet 4/8/2024 -- Holdsworth, Alayna, PA-C          Follow-up Information       Follow up With Specialties Details Why Contact Info    [facility] - Emergency Dept Emergency Medicine  If symptoms worsen [address] [facility] [place] [address] [phone]             Holdsworth, Alayna, PA-C  04/09/24 9826

## 2024-04-08 NOTE — DISCHARGE INSTRUCTIONS
Jhonatan por venir a nuestro Departamento de Emergencias hoy. Es importante recordar que algunos problemas o condiciones médicas son difíciles de diagnosticar y es posible que no se encuentren o aborden saad young visita al Departamento de Emergencias. Estas condiciones a menudo comienzan con síntomas inespecíficos y solo se pueden diagnosticar en visitas de seguimiento con young médico de atención primaria o especialista cuando los síntomas continúan o cambian. Recuerde que todas las condiciones médicas pueden cambiar y no podemos predecir cómo se sentirá mañana o al día siguiente. Regrese a la susan de emergencias si tiene preguntas/inquietudes, síntomas nuevos/preocupantes, empeoramiento o falta de mejora.    Asegúrese de hacer un seguimiento con young médico de atención primaria y revisar con ellos todos los laboratorios/imágenes/pruebas que se realizaron saad young visita a la susan de emergencias. Es muy común que identifiquemos hallazgos incidentales no emergentes que deben ser objeto de seguimiento con young médico de atención primaria. Algunos laboratorios/imágenes/pruebas pueden estar fuera del rango normal y requieren un seguimiento que no sea de emergencia y/o más investigación/tratamiento/procedimientos/pruebas para ayudar a diagnosticar/excluir/prevenir complicaciones u otras afecciones médicas potencialmente graves. Algunas anormalidades pueden no laura sido discutidas o abordadas saad young visita a la susan de emergencias. Es posible que algunos resultados de laboratorio no regresen saad young visita a la susan de emergencias, adriana se puede acceder a ellos descargando la aplicación gratuita [facility] o visitando [email or web address] Es importante que revise con young médico todas las pruebas de laboratorio/imágenes/pruebas que estén fuera del rango normal.    Celia visita a la susan de emergencias no reemplaza celia visita de atención primaria, y muchas pruebas de detección o de seguimiento no pueden ser  ordenadas por un médico de emergencia ni realizadas por la susan de emergencias. Algunas pruebas pueden incluso requerir aprobación previa.    Si no tiene un médico de atención primaria, puede comunicarse con el que figura en la documentación del talya o también puede llamar al mostrador de citas de la [facility] al [phone] o a [address] [place] [address] al [phone] para programar celia cydney. cydney, o establecer atención con un médico de atención primaria o incluso un especialista y para obtener información sobre los recursos locales. Es importante para young rivera que tenga un médico de atención primaria.    [place] todos los medicamentos según las indicaciones. Hemos hecho todo lo posible para seleccionar un medicamento para usted que tratará young condición; sin embargo, todos los medicamentos pueden tener efectos secundarios y es imposible predecir qué medicamentos pueden causarle efectos secundarios o cuáles (si los hay) esos medicamentos le pueden roberto. Si siente que está teniendo un efecto negativo o un efecto secundario de algún medicamento, debe dejar de chauncey esos medicamentos de inmediato y buscar atención médica. Si siente que está teniendo celia reacción potencialmente mortal, llame al 911.    No conduzca, nade, suba a [place], se bañe, opere maquinaria pesada, alexis alcohol o tome medicamentos potencialmente sedantes, firme ningún documento legal o tome decisiones importantes saad 24 horas si ha recibido algún medicamento para el dolor, sedantes o alteradores del estado de ánimo. medicamentos saad young visita a la susan de emergencias o dentro de las 24 horas de haberlos tomado si se los borja recetado.    Puede encontrar recursos adicionales para dentistas, audífonos, equipos médicos duraderos, farmacias de bajo costo y otros recursos en [email or web address]

## 2024-08-25 ENCOUNTER — HOSPITAL ENCOUNTER (EMERGENCY)
Facility: HOSPITAL | Age: 54
Discharge: HOME | End: 2024-08-25
Attending: STUDENT IN AN ORGANIZED HEALTH CARE EDUCATION/TRAINING PROGRAM

## 2024-08-25 VITALS
WEIGHT: 155 LBS | HEART RATE: 78 BPM | TEMPERATURE: 98 F | DIASTOLIC BLOOD PRESSURE: 89 MMHG | OXYGEN SATURATION: 98 % | RESPIRATION RATE: 20 BRPM | BODY MASS INDEX: 24.28 KG/M2 | SYSTOLIC BLOOD PRESSURE: 135 MMHG

## 2024-08-25 DIAGNOSIS — K05.30 PERIODONTITIS: ICD-10-CM

## 2024-08-25 DIAGNOSIS — K08.89 DENTALGIA: Primary | ICD-10-CM

## 2024-08-25 PROCEDURE — 99284 EMERGENCY DEPT VISIT MOD MDM: CPT

## 2024-08-25 PROCEDURE — 25000003 PHARM REV CODE 250: Performed by: STUDENT IN AN ORGANIZED HEALTH CARE EDUCATION/TRAINING PROGRAM

## 2024-08-25 RX ORDER — HYDROCODONE BITARTRATE AND ACETAMINOPHEN 5; 325 MG/1; MG/1
1 TABLET ORAL EVERY 8 HOURS PRN
Qty: 9 TABLET | Refills: 0 | Status: SHIPPED | OUTPATIENT
Start: 2024-08-25 | End: 2024-08-28

## 2024-08-25 RX ORDER — AMOXICILLIN AND CLAVULANATE POTASSIUM 875; 125 MG/1; MG/1
1 TABLET, FILM COATED ORAL
Status: COMPLETED | OUTPATIENT
Start: 2024-08-25 | End: 2024-08-25

## 2024-08-25 RX ORDER — AMOXICILLIN AND CLAVULANATE POTASSIUM 875; 125 MG/1; MG/1
1 TABLET, FILM COATED ORAL 2 TIMES DAILY
Qty: 20 TABLET | Refills: 0 | Status: SHIPPED | OUTPATIENT
Start: 2024-08-25 | End: 2024-09-04

## 2024-08-25 RX ORDER — KETOROLAC TROMETHAMINE 30 MG/ML
30 INJECTION, SOLUTION INTRAMUSCULAR; INTRAVENOUS
Status: DISCONTINUED | OUTPATIENT
Start: 2024-08-25 | End: 2024-08-25 | Stop reason: HOSPADM

## 2024-08-25 RX ADMIN — AMOXICILLIN AND CLAVULANATE POTASSIUM 1 TABLET: 875; 125 TABLET, FILM COATED ORAL at 01:08

## 2024-08-25 NOTE — DISCHARGE INSTRUCTIONS
Problem Specific Instructions: This emergency department does not have the resources available to definitively treat your dental problem. For this, you need to see a dentist. You may have been offered pain medicine, an injection, topical medicine, or antibiotics and need to take those medicines as instructed.    DENTAL RESOURCES      [facility]       [phone]     [facility] 8-4 Monday - Friday       [phone]     [facility]       [phone]     [facility]                                 0-6 years                                                                                             7-13 years     [phone]     [facility]    [facility]     [phone]     [facility]       [phone]     [facility]    [address], Mon-Fri not on Wed  8am-4pm    Over 60 years old living in [place]           [phone]     [facility]    [address] [place]     [phone]     [facility] [place]       [phone]     [facility] Patients  [address]       [phone]     [facility]       [phone]        If you received or are discharged with pain medicine or muscle relaxers, understand that they can make you sleepy or impair your judgement. Do not make important decisions, drink, drive, swim or perform any other tasks you would not otherwise perform while impaired for at least 24 hours after your last dose.      Ensure you follow up with your Primary Care Provider or any additional providers listed on this discharge sheet. While you may be healthy enough to go home today, I cannot predict the exact course of your diagnoses. It is important to remember that some  problems are difficult to diagnose and may not be found during your first visit. As such, it is your responsibility to monitor symptoms, follow-up with another healthcare provider, or return to the emergency room for new or worsening concerns. Unless otherwise instructed, continue all home medications and any new medications prescribed to you in the Emergency Department.     General Maintenance for otherwise healthy adults: Ensure adequate hydration to prevent prolonged illness and recovery. This should include about 14-16 cups of water daily.  Monitor your caloric intake with a goal of obtaining and maintaining a healthy weight and BMI to help prevent the development of chronic and life-threatening medical conditions. Talk with your primary care provider about how to start healthy fitness habits and aim for a goal of 30 minutes to an hour of exercise 3-5 times a week. Avoid the use of tobacco, alcohol, and illicit drugs as these may be detrimental to your health goals.

## 2024-08-25 NOTE — ED PROVIDER NOTES
Encounter Date: 8/25/2024       History     Chief Complaint   Patient presents with    Dental Pain     Starting today     53 y.o. male with history below presents for evaluation of left dental pain.  Patient reports 4 months ago he received caps to the left mandibular molar.  Tells me that over the last couple days has had worsening pain.  No fevers.  No dysphonia, dysphagia or dyspnea.  He is concerned about infection.  He has follow-up appointment scheduled in 2 days with dentist.    Triage vitals were reviewed and are: Initial Vitals [08/25/24 0042]  BP: (!) 139/101  Pulse: 85  Resp: 14  Temp: 98.3 °F (36.8 °C)  SpO2: 98 %  MAP: n/a    The Patient:   has no past medical history on file.   has a past surgical history that includes Closed reduction (Right, 2/5/2020).   reports that he has been smoking. He has never used smokeless tobacco. He reports that he does not drink alcohol and does not use drugs.  Has allergies listed as: Patient has no known allergies.        The history is provided by the patient. The history is limited by a language barrier. A  was used.     Review of patient's allergies indicates:  No Known Allergies  No past medical history on file.  Past Surgical History:   Procedure Laterality Date    CLOSED REDUCTION Right 2/5/2020    Procedure: CLOSED REDUCTION RIGHT SHOULDER;  Surgeon: Malou Surgeon;  Location: [facility];  Service: Anesthesiology;  Laterality: Right;     No family history on file.  Social History     Tobacco Use    Smoking status: Every Day    Smokeless tobacco: Never   Substance Use Topics    Alcohol use: No    Drug use: No     Review of Systems   Constitutional:  Negative for chills, fatigue and fever.   HENT:  Positive for dental problem. Negative for congestion, hearing loss, sore throat and trouble swallowing.    Eyes:  Negative for visual disturbance.   Respiratory:  Negative for cough, chest tightness and shortness of breath.    Cardiovascular:  Negative  for chest pain.   Gastrointestinal:  Negative for abdominal pain, constipation, diarrhea, nausea and vomiting.   Endocrine: Negative for cold intolerance and heat intolerance.   Genitourinary:  Negative for difficulty urinating and frequency.   Musculoskeletal:  Negative for arthralgias and myalgias.   Skin:  Negative for rash.   Neurological:  Negative for dizziness and headaches.   Psychiatric/Behavioral:  Negative for suicidal ideas. The patient is not nervous/anxious.        Physical Exam     Initial Vitals [08/25/24 0042]   BP Pulse Resp Temp SpO2   (!) 139/101 85 14 98.3 °F (36.8 °C) 98 %      MAP       --         Physical Exam    Nursing note and vitals reviewed.  Constitutional: He appears well-developed and well-nourished.   Body mass index is 24.28 kg/m².   HENT:   Head: Normocephalic and atraumatic.   Overall poor oral dentition.  Left mandibular molar with cap in place.  No acute fracture.  Surrounding periodontal inflammation.  No fluctuance or discharge.  Airway patent.   Eyes: EOM are normal. Pupils are equal, round, and reactive to light.   Neck: Neck supple.   Cardiovascular:  Normal rate, regular rhythm and intact distal pulses.           Pulmonary/Chest: No respiratory distress.   Musculoskeletal:      Cervical back: Neck supple.     Neurological: He is alert and oriented to person, place, and time. GCS score is 15. GCS eye subscore is 4. GCS verbal subscore is 5. GCS motor subscore is 6.   Skin: Skin is warm and dry. Capillary refill takes less than 2 seconds.   Psychiatric: He has a normal mood and affect. His behavior is normal.         ED Course   Procedures  Labs Reviewed - No data to display       Imaging Results    None          Medications   ketorolac injection 30 mg (30 mg Intramuscular Not Given 8/25/24 0115)   amoxicillin-clavulanate 875-125mg per tablet 1 tablet (1 tablet Oral Given 8/25/24 0117)     Medical Decision Making  Encounter Date:  "8/25/2024  --------------------------------------------------------------------------  53 y.o. male presents for evaluation of dental pain.  Hemodynamically stable. Afebrile. Phonating and protecting the airway spontaneously. No clinical evidence for cardiovascular instability or impending airway compromise.  Remainder of physical exam as above.    Additional or Independent Historians available and contributing to the history as above: NONE  Prior medical records, when available, were reviewed. This includes a review of the patients comorbidities, medications, and recent hospital or outpatient notes.   Comorbid Conditions affecting evaluation, treatment or discharge planning: NONE IDENTIFIED   Social Determinates of Health identified and considered in the evaluation and treatment of this patient: Uninsured or Underinsured    Differential diagnoses includes but is not limited to:   Jordi's angina, acute necrotizing ulcerative gingivitis, epiglottitis, parotitis, gingival abscess, facial cellulitis, peritonsillar/retropharyngeal abscess, sialolithiasis, periapical abscess, pulpitis, dental fracture, dental caries, aphthous ulcer.  --------------------------------------------------------------------------  All available clinical tests were reviewed by me and documented in the ED course.  Vitals range:   Temp:  [98.3 °F (36.8 °C)] 98.3 °F (36.8 °C)  Pulse:  [78-85] 78  Resp:  [14-20] 20  SpO2:  [98 %] 98 %  BP: (135-139)/() 135/89  Estimated body mass index is 24.28 kg/m² as calculated from the following:    Height as of 4/8/24: 5' 7" (1.702 m).    Weight as of this encounter: 70.3 kg (155 lb).    ED MEDS GIVEN:  Medications  ketorolac injection 30 mg (30 mg Intramuscular Not Given 8/25/24 0115)  amoxicillin-clavulanate 875-125mg per tablet 1 tablet (1 tablet Oral Given 8/25/24 0117)    Procedures Performed: None  --------------------------------------------------------------------------  Clinical picture today " most consistent with dentalgia, periodontal disease.  Will start on Augmentin, give short course of Norco for pain management.  Offered Toradol shot however patient declined.  Follow-up in 2 days with dentist.  Doubt alternate pathology as listed in the differential above.    I see no indication of an emergent process beyond that addressed during our encounter but have duly counseled the patient/family regarding the need for prompt follow-up as well as the indications that should prompt immediate return to the emergency room should new or worrisome developments occur.  The patient/family has been provided with verbal and printed direction regarding our final diagnosis(es) as well as instructions regarding use of OTC and/or Rx medications intended to manage the patient's conditions.   The patient/family communicates understanding of all this information and all remaining questions and concerns were addressed at this time.  DISCLAIMER: This note was prepared with [employer] voice recognition transcription software. Garbled syntax, mangled pronouns, and other bizarre constructions may be attributed to that software system.    Final diagnoses:  [K08.89] Dentalgia (Primary)  [K05.30] Periodontitis                Risk  Prescription drug management.               ED Course as of 08/25/24 0155   Sun Aug 25, 2024   0044 BP(!): 139/101 [AN]   0044 Temp: 98.3 °F (36.8 °C) [AN]   0044 Pulse: 85 [AN]   0044 Resp: 14 [AN]   0044 SpO2: 98 % [AN]      ED Course User Index  [AN] Doug Hutchinson PA-C                           Clinical Impression:  Final diagnoses:  [K08.89] Dentalgia (Primary)  [K05.30] Periodontitis          ED Disposition Condition    Discharge Stable          ED Prescriptions       Medication Sig Dispense Start Date End Date Auth. Provider    amoxicillin-clavulanate 875-125mg (AUGMENTIN) 875-125 mg per tablet Take 1 tablet by mouth 2 (two) times daily. for 10 days 20 tablet 8/25/2024 9/4/2024 Doug Hutchinson PA-C     HYDROcodone-acetaminophen (NORCO) 5-325 mg per tablet Take 1 tablet by mouth every 8 (eight) hours as needed for Pain. 9 tablet 8/25/2024 8/28/2024 Doug Hutchinson PA-C          Follow-up Information       Follow up With Specialties Details Why Contact Info    [facility] Emergency Dept Emergency Medicine Go to  As needed, If symptoms worsen [address] [facility] [place] [id number] [phone]    Primary Care Manager  Schedule an appointment as soon as possible for a visit in 1 week               Doug Hutchinson PA-C  08/25/24 0155